# Patient Record
Sex: MALE | Race: WHITE | Employment: FULL TIME | URBAN - METROPOLITAN AREA
[De-identification: names, ages, dates, MRNs, and addresses within clinical notes are randomized per-mention and may not be internally consistent; named-entity substitution may affect disease eponyms.]

---

## 2018-03-01 ENCOUNTER — OFFICE VISIT (OUTPATIENT)
Dept: SURGERY | Age: 48
End: 2018-03-01

## 2018-03-01 VITALS
BODY MASS INDEX: 35.5 KG/M2 | HEIGHT: 70 IN | SYSTOLIC BLOOD PRESSURE: 122 MMHG | WEIGHT: 248 LBS | DIASTOLIC BLOOD PRESSURE: 60 MMHG

## 2018-03-01 DIAGNOSIS — D17.1 LIPOMA OF TORSO: Primary | ICD-10-CM

## 2018-03-01 PROCEDURE — G8427 DOCREV CUR MEDS BY ELIG CLIN: HCPCS | Performed by: SURGERY

## 2018-03-01 PROCEDURE — G8417 CALC BMI ABV UP PARAM F/U: HCPCS | Performed by: SURGERY

## 2018-03-01 PROCEDURE — G8484 FLU IMMUNIZE NO ADMIN: HCPCS | Performed by: SURGERY

## 2018-03-01 PROCEDURE — 99241 PR OFFICE CONSULTATION NEW/ESTAB PATIENT 15 MIN: CPT | Performed by: SURGERY

## 2018-03-01 ASSESSMENT — ENCOUNTER SYMPTOMS
ABDOMINAL PAIN: 1
ALLERGIC/IMMUNOLOGIC NEGATIVE: 1
RESPIRATORY NEGATIVE: 1
EYES NEGATIVE: 1

## 2018-03-01 NOTE — PROGRESS NOTES
Subjective:      Billie Pierre is a 56 y.o. male     CC: Subcutaneous mass of the abdominal wall    HPI: 79-year-old male who presents for evaluation of a subcutaneous mass of the abdominal wall which has been present for about 2 weeks. The mass is very tender and causes shooting pains down his abdomen. The patient reports the mass increases and decreases in size. Family History   Problem Relation Age of Onset   Gemini Gr Breast Cancer Mother     Stroke Father     Atrial Fibrillation Father        Past Medical History:   Diagnosis Date    Hyperlipidemia     Prostatitis        Past Surgical History:   Procedure Laterality Date    HERNIA REPAIR      BABY BILAT ING HERNIA           Prior to Visit Medications    Medication Sig Taking? Authorizing Provider   metFORMIN (GLUCOPHAGE) 500 MG tablet TAKE 1 TABLET BY MOUTH TWICE A DAY Yes Historical Provider, MD   sertraline (ZOLOFT) 50 MG tablet Take 50 mg by mouth daily Yes Historical Provider, MD   Finasteride (PROPECIA PO) Take by mouth Yes Historical Provider, MD       Social History     Social History    Marital status:      Spouse name: N/A    Number of children: N/A    Years of education: N/A     Occupational History    Not on file. Social History Main Topics    Smoking status: Former Smoker     Packs/day: 0.50    Smokeless tobacco: Never Used    Alcohol use No    Drug use: No    Sexual activity: Not on file     Other Topics Concern    Not on file     Social History Narrative    No narrative on file       Review of Systems   Constitutional: Negative. HENT: Negative. Eyes: Negative. Respiratory: Negative. Cardiovascular: Negative. Gastrointestinal: Positive for abdominal pain. Endocrine: Negative. Genitourinary: Negative. Musculoskeletal: Negative. Skin: Negative. Allergic/Immunologic: Negative. Neurological: Negative. Hematological: Negative. Psychiatric/Behavioral: Negative.         Objective: Physical Exam   Constitutional: He is oriented to person, place, and time. He appears well-developed and well-nourished. HENT:   Head: Normocephalic and atraumatic. Right Ear: External ear normal.   Left Ear: External ear normal.   Eyes: Conjunctivae and EOM are normal.   Neck: Normal range of motion. Neck supple. Cardiovascular: Normal rate and regular rhythm. Pulmonary/Chest: Effort normal and breath sounds normal.   Musculoskeletal: Normal range of motion. He exhibits no edema. Neurological: He is alert and oriented to person, place, and time. Skin: Skin is warm and dry. Psychiatric: He has a normal mood and affect. His behavior is normal.    no evidence of a ventral hernia  The patient has a 3 cm subcu mass of the right upper quadrant of the abdominal wall. The mass is tender to palpation    Assessment:      56year old male who presents for evaluation of a tender subcutaneous mass of the right upper quadrant of the abdomen. On physical examination, the patient has no evidence of a ventral hernia. He does have a 3 cm subcutaneous mass which is clinically consistent with a lipoma. Plan: Will plan for excision of lipoma of the abdominal wall to earliest availability.

## 2018-03-08 ENCOUNTER — HOSPITAL ENCOUNTER (OUTPATIENT)
Dept: SURGERY | Age: 48
Discharge: HOME | End: 2018-03-08
Attending: SURGERY | Admitting: SURGERY

## 2018-03-08 VITALS
OXYGEN SATURATION: 99 % | DIASTOLIC BLOOD PRESSURE: 86 MMHG | TEMPERATURE: 97 F | RESPIRATION RATE: 16 BRPM | HEART RATE: 78 BPM | SYSTOLIC BLOOD PRESSURE: 145 MMHG

## 2018-03-08 DIAGNOSIS — D17.1 LIPOMA OF TORSO: Primary | ICD-10-CM

## 2018-03-08 LAB
A/G RATIO: 1.9 (ref 1.1–2.2)
ALBUMIN SERPL-MCNC: 4.2 G/DL (ref 3.4–5)
ALP BLD-CCNC: 77 U/L (ref 40–129)
ALT SERPL-CCNC: 38 U/L (ref 10–40)
ANION GAP SERPL CALCULATED.3IONS-SCNC: 12 MMOL/L (ref 3–16)
AST SERPL-CCNC: 29 U/L (ref 15–37)
BILIRUB SERPL-MCNC: 0.4 MG/DL (ref 0–1)
BUN BLDV-MCNC: 16 MG/DL (ref 7–20)
CALCIUM SERPL-MCNC: 9.3 MG/DL (ref 8.3–10.6)
CHLORIDE BLD-SCNC: 105 MMOL/L (ref 99–110)
CO2: 25 MMOL/L (ref 21–32)
CREAT SERPL-MCNC: 1.1 MG/DL (ref 0.9–1.3)
GFR AFRICAN AMERICAN: >60
GFR NON-AFRICAN AMERICAN: >60
GLOBULIN: 2.2 G/DL
GLUCOSE BLD-MCNC: 105 MG/DL (ref 70–99)
HCT VFR BLD CALC: 42.6 % (ref 40.5–52.5)
HEMOGLOBIN: 14.9 G/DL (ref 13.5–17.5)
MCH RBC QN AUTO: 31.2 PG (ref 26–34)
MCHC RBC AUTO-ENTMCNC: 35 G/DL (ref 31–36)
MCV RBC AUTO: 89.1 FL (ref 80–100)
PDW BLD-RTO: 13.2 % (ref 12.4–15.4)
PLATELET # BLD: 207 K/UL (ref 135–450)
PMV BLD AUTO: 6.9 FL (ref 5–10.5)
POTASSIUM SERPL-SCNC: 4.3 MMOL/L (ref 3.5–5.1)
RBC # BLD: 4.78 M/UL (ref 4.2–5.9)
SODIUM BLD-SCNC: 142 MMOL/L (ref 136–145)
TOTAL PROTEIN: 6.4 G/DL (ref 6.4–8.2)
WBC # BLD: 7.5 K/UL (ref 4–11)

## 2018-03-08 PROCEDURE — 22903 EXC ABD LES SC 3 CM/>: CPT | Performed by: SURGERY

## 2018-03-08 RX ORDER — FENTANYL CITRATE 50 UG/ML
25 INJECTION, SOLUTION INTRAMUSCULAR; INTRAVENOUS EVERY 5 MIN PRN
Status: DISCONTINUED | OUTPATIENT
Start: 2018-03-08 | End: 2018-03-09 | Stop reason: HOSPADM

## 2018-03-08 RX ORDER — FENTANYL CITRATE 50 UG/ML
50 INJECTION, SOLUTION INTRAMUSCULAR; INTRAVENOUS EVERY 5 MIN PRN
Status: DISCONTINUED | OUTPATIENT
Start: 2018-03-08 | End: 2018-03-09 | Stop reason: HOSPADM

## 2018-03-08 RX ORDER — OXYCODONE HYDROCHLORIDE AND ACETAMINOPHEN 5; 325 MG/1; MG/1
1 TABLET ORAL PRN
Status: ACTIVE | OUTPATIENT
Start: 2018-03-08 | End: 2018-03-08

## 2018-03-08 RX ORDER — HYDROCODONE BITARTRATE AND ACETAMINOPHEN 5; 325 MG/1; MG/1
1-2 TABLET ORAL EVERY 4 HOURS PRN
Qty: 15 TABLET | Refills: 0 | Status: SHIPPED | OUTPATIENT
Start: 2018-03-08 | End: 2018-03-13

## 2018-03-08 RX ORDER — LABETALOL HYDROCHLORIDE 5 MG/ML
5 INJECTION, SOLUTION INTRAVENOUS EVERY 10 MIN PRN
Status: DISCONTINUED | OUTPATIENT
Start: 2018-03-08 | End: 2018-03-09 | Stop reason: HOSPADM

## 2018-03-08 RX ORDER — DIPHENHYDRAMINE HYDROCHLORIDE 50 MG/ML
12.5 INJECTION INTRAMUSCULAR; INTRAVENOUS
Status: ACTIVE | OUTPATIENT
Start: 2018-03-08 | End: 2018-03-08

## 2018-03-08 RX ORDER — OXYCODONE HYDROCHLORIDE AND ACETAMINOPHEN 5; 325 MG/1; MG/1
2 TABLET ORAL PRN
Status: ACTIVE | OUTPATIENT
Start: 2018-03-08 | End: 2018-03-08

## 2018-03-08 RX ORDER — MEPERIDINE HYDROCHLORIDE 25 MG/ML
12.5 INJECTION INTRAMUSCULAR; INTRAVENOUS; SUBCUTANEOUS EVERY 5 MIN PRN
Status: DISCONTINUED | OUTPATIENT
Start: 2018-03-08 | End: 2018-03-09 | Stop reason: HOSPADM

## 2018-03-08 RX ORDER — ONDANSETRON 2 MG/ML
4 INJECTION INTRAMUSCULAR; INTRAVENOUS
Status: ACTIVE | OUTPATIENT
Start: 2018-03-08 | End: 2018-03-08

## 2018-03-08 RX ORDER — SODIUM CHLORIDE 9 MG/ML
INJECTION, SOLUTION INTRAVENOUS CONTINUOUS
Status: DISCONTINUED | OUTPATIENT
Start: 2018-03-08 | End: 2018-03-09 | Stop reason: HOSPADM

## 2018-03-08 RX ORDER — HYDROMORPHONE HCL 110MG/55ML
0.5 PATIENT CONTROLLED ANALGESIA SYRINGE INTRAVENOUS EVERY 5 MIN PRN
Status: DISCONTINUED | OUTPATIENT
Start: 2018-03-08 | End: 2018-03-09 | Stop reason: HOSPADM

## 2018-03-08 RX ADMIN — SODIUM CHLORIDE: 9 INJECTION, SOLUTION INTRAVENOUS at 07:25

## 2018-03-08 ASSESSMENT — PAIN DESCRIPTION - FREQUENCY: FREQUENCY: CONTINUOUS

## 2018-03-08 ASSESSMENT — PAIN DESCRIPTION - DESCRIPTORS
DESCRIPTORS: ACHING
DESCRIPTORS: ACHING;CONSTANT

## 2018-03-08 ASSESSMENT — PAIN SCALES - GENERAL
PAINLEVEL_OUTOF10: 3
PAINLEVEL_OUTOF10: 4

## 2018-03-08 ASSESSMENT — PAIN DESCRIPTION - ORIENTATION: ORIENTATION: RIGHT

## 2018-03-08 ASSESSMENT — PAIN DESCRIPTION - LOCATION: LOCATION: ABDOMEN

## 2018-03-08 NOTE — H&P
[facility]      I have reviewed the history and physical and examined the patient and find no relevant changes. I have reviewed with the patient and/or family the risks, benefits, and alternatives to the procedure.     Meredith Love MD  3/8/2018

## 2018-03-08 NOTE — ANESTHESIA PRE-OP
BP Readings from Last 3 Encounters:   03/08/18 115/69   03/01/18 122/60   02/14/12 142/83       NPO Status: Time of last liquid consumption: 1800                        Time of last solid consumption: 1800                        Date of last liquid consumption: 03/07/18                        Date of last solid food consumption: 03/07/18    BMI:   Wt Readings from Last 3 Encounters:   03/05/18 249 lb (112.9 kg)   03/01/18 248 lb (112.5 kg)   02/14/12 220 lb (99.8 kg)     There is no height or weight on file to calculate BMI. Anesthesia Evaluation  Patient summary reviewed and Nursing notes reviewed  Airway: Mallampati: II  TM distance: >3 FB   Neck ROM: full  Mouth opening: > = 3 FB Dental: normal exam         Pulmonary:Negative Pulmonary ROS                              Cardiovascular:  Exercise tolerance: good (>4 METS),                     Neuro/Psych:   Negative Neuro/Psych ROS              GI/Hepatic/Renal: Neg GI/Hepatic/Renal ROS            Endo/Other:                      ROS comment: Glucose intolerance Abdominal:           Vascular:                                        Anesthesia Plan      MAC     ASA 2       Induction: intravenous. Anesthetic plan and risks discussed with patient. Plan discussed with CRNA.                   Michelle Lawrence MD   3/8/2018

## 2018-03-08 NOTE — BRIEF OP NOTE
Brief Postoperative Note    Jillian Dale  YOB: 1970  [id number]    Pre-operative Diagnosis: lipoma of the abdominal wall    Post-operative Diagnosis: Same    Procedure: excision of lipoma of the abdomen    Anesthesia: MAC and Local    Surgeons/Assistants: Bridget Esparza    Estimated Blood Loss: less than 50     Complications: None    Specimens: Was Obtained: lipoma    Findings: 3.5 cm lipoma    Electronically signed by Tonie Lee MD on 3/8/2018 at 8:45 AM

## 2018-03-08 NOTE — ANESTHESIA POST-OP
Anesthesia Post-op Note    Patient: Jose Sterling  MRN: [id number]  YOB: 1970  Date of evaluation: 3/8/2018  Time:  1:21 PM     Procedure(s) Performed:     Last Vitals: BP (!) 145/86   Pulse 78   Temp 97 °F (36.1 °C) (Temporal)   Resp 16   SpO2 99%     Aditya Phase I: Aditya Score: 8    Aditya Phase II: Aditya Score: 10    Anesthesia Post Evaluation    Final anesthesia type: general  Patient location during evaluation: PACU  Patient participation: complete - patient participated  Level of consciousness: awake and alert  Airway patency: patent  Nausea & Vomiting: no nausea and no vomiting  Complications: no  Cardiovascular status: hemodynamically stable  Respiratory status: acceptable  Hydration status: stable        Joselin Holguin MD  1:21 PM

## 2022-06-07 NOTE — PROGRESS NOTES
[facility]   Cardiac Consultation    Referring Provider:  Damien Fiore MD     Chief Complaint   Patient presents with    Chest Pain     right arm stiffness and pain     New Patient        History of Present Illness:  Reece Escamilla is a 49 y.o.male patient with h/o hyperlipidemia, prediabetic and prostatitis and carpal tunnel. He smoked 20 years ago, he does not drink. Today, Reece Escamilla is here as a new patient for chest pain. 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 He lost 35 lbs over last 4 mos. He explains this CP happened about 2 weeks ago - he had mid chest pain when working on a ladder/putting trim on door way, it radiated to his back and arm. He denies chest pain when exerting self on golf course. Denies any symptoms at this time. Past Medical History:   has a past medical history of Hyperlipidemia, Inflammatory disorder of cartilage, Prediabetes, and Prostatitis. Surgical History:   has a past surgical history that includes hernia repair and other surgical history (Right, 03/08/2018). Social History:   reports that he quit smoking about 15 years ago. His smoking use included cigarettes. He has a 6.00 pack-year smoking history. He has never used smokeless tobacco. He reports current alcohol use of about 3.0 standard drinks of alcohol per week. He reports that he does not use drugs. Family History:  family history includes Atrial Fibrillation in his father; Breast Cancer in his mother; Stroke in his father. Home Medications:  Prior to Admission medications    Medication Sig Start Date End Date Taking?  Authorizing Provider   amphetamine-dextroamphetamine (ADDERALL) 10 MG tablet TAKE 1 TABLET BY MOUTH EVERY DAY AFTERNOON 6/4/22  Yes Historical Provider, MD   rosuvastatin (CRESTOR) 20 MG tablet  6/7/22  Yes Historical Provider, MD   traZODone (DESYREL) 50 MG tablet TAKE 1 TO 2 TABLETS BY MOUTH AT BEDTIME 5/18/22  Yes Historical Provider, MD   metFORMIN (GLUCOPHAGE) 500 MG tablet TAKE 1 TABLET BY MOUTH TWICE A DAY  Patient not taking: Reported on 6/8/2022 2/9/18   Historical Provider, MD   sertraline (ZOLOFT) 50 MG tablet Take 50 mg by mouth daily  Patient not taking: Reported on 6/8/2022    Historical Provider, MD   Finasteride (PROPECIA PO) Take by mouth  Patient not taking: Reported on 6/8/2022    Historical Provider, MD        Allergies:  Food     Review of Systems:   · Constitutional: there has been no unanticipated weight loss. There's been no change in energy level, sleep pattern, or activity level. · Eyes: No visual changes or diplopia. No scleral icterus. · ENT: No Headaches, hearing loss or vertigo. No mouth sores or sore throat. · Cardiovascular: Reviewed in HPI  · Respiratory: No cough or wheezing, no sputum production. No hematemesis. · Gastrointestinal: No abdominal pain, appetite loss, blood in stools. No change in bowel or bladder habits. · Genitourinary: No dysuria, trouble voiding, or hematuria. · Musculoskeletal:  No gait disturbance, weakness or joint complaints. · Integumentary: No rash or pruritis. · Neurological: No headache, diplopia, change in muscle strength, numbness or tingling. No change in gait, balance, coordination, mood, affect, memory, mentation, behavior. · Psychiatric: No anxiety, no depression. · Endocrine: No malaise, fatigue or temperature intolerance. No excessive thirst, fluid intake, or urination. No tremor. · Hematologic/Lymphatic: No abnormal bruising or bleeding, blood clots or swollen lymph nodes. · Allergic/Immunologic: No nasal congestion or hives.     Physical Examination:    Vitals:    06/08/22 1119   BP: 120/86   Pulse: 87   SpO2: 97%        Wt Readings from Last 1 Encounters:   06/08/22 226 lb 14.4 oz (102.9 kg)        Constitutional and General Appearance: NAD   Skin:good turgor,intact without lesions  HEENT: EOMI ,normal  Neck:no JVD     Respiratory:  · Normal excursion and expansion without use of accessory muscles  · Resp Auscultation: Normal breath sounds without dullness  Cardiovascular:  · The apical impulses not displaced  · Heart tones are crisp and normal  · Cervical veins are not engorged  · The carotid upstroke is normal in amplitude and contour without delay or bruit  · Peripheral pulses are symmetrical and full  · There is no clubbing, cyanosis of the extremities. · No edema  · Femoral Arteries: 2+ and equal  · Pedal Pulses: 2+ and equal   Abdomen:  · No masses or tenderness  · Liver/Spleen: No Abnormalities Noted  Neurological/Psychiatric:  · Alert and oriented in all spheres  · Moves all extremities well  · Exhibits normal gait balance and coordination  · No abnormalities of mood, affect, memory, mentation, or behavior are noted      Assessment:    Chest pain    Likely muscular pain  Stress echo at [facility]    Hyperlipidemia  Continue on crestor      Family history of heart disease  Dad with h/o stroke and long QT and s/p watchman. His mom  of CHF      Plan:  Cardiac test and lab results personally reviewed by me during this office visit and discussed. Stress echo at [facility] when I am at [facility]  Call if CP episodes reoccur   Call if any new or worsening symptoms present. Follow up in based on testing      I appreciate the opportunity of cooperating in the care of this individual.    Blossom Castro. Norris Mcconnell M.D., [facility]    Patient's problem list, medications, allergies, past medical, surgical, social and family histories were reviewed and updated as appropriate. Scribe's attestation: This note was scribed in the presence of Dr Norris Mcconnell by Vickey Coronado RN. The scribe's documentation has been prepared under my direction and personally reviewed by me in its entirety. I confirm that the note above accurately reflects all work, treatment, procedures, and medical decision making performed by me.

## 2022-06-08 ENCOUNTER — OFFICE VISIT (OUTPATIENT)
Dept: CARDIOLOGY CLINIC | Age: 52
End: 2022-06-08
Payer: COMMERCIAL

## 2022-06-08 VITALS
BODY MASS INDEX: 32.48 KG/M2 | HEART RATE: 87 BPM | OXYGEN SATURATION: 97 % | DIASTOLIC BLOOD PRESSURE: 86 MMHG | SYSTOLIC BLOOD PRESSURE: 120 MMHG | WEIGHT: 226.9 LBS | HEIGHT: 70 IN

## 2022-06-08 DIAGNOSIS — R07.9 CHEST PAIN, UNSPECIFIED TYPE: Primary | ICD-10-CM

## 2022-06-08 DIAGNOSIS — E78.5 HYPERLIPIDEMIA, UNSPECIFIED HYPERLIPIDEMIA TYPE: ICD-10-CM

## 2022-06-08 DIAGNOSIS — Z82.49 FAMILY HISTORY OF HEART DISEASE: ICD-10-CM

## 2022-06-08 PROCEDURE — 93000 ELECTROCARDIOGRAM COMPLETE: CPT | Performed by: INTERNAL MEDICINE

## 2022-06-08 PROCEDURE — 99214 OFFICE O/P EST MOD 30 MIN: CPT | Performed by: INTERNAL MEDICINE

## 2022-06-08 PROCEDURE — 3017F COLORECTAL CA SCREEN DOC REV: CPT | Performed by: INTERNAL MEDICINE

## 2022-06-08 PROCEDURE — G8427 DOCREV CUR MEDS BY ELIG CLIN: HCPCS | Performed by: INTERNAL MEDICINE

## 2022-06-08 PROCEDURE — G8417 CALC BMI ABV UP PARAM F/U: HCPCS | Performed by: INTERNAL MEDICINE

## 2022-06-08 PROCEDURE — 1036F TOBACCO NON-USER: CPT | Performed by: INTERNAL MEDICINE

## 2022-06-08 RX ORDER — TRAZODONE HYDROCHLORIDE 50 MG/1
TABLET ORAL
COMMUNITY
Start: 2022-05-18

## 2022-06-08 RX ORDER — ROSUVASTATIN CALCIUM 20 MG/1
TABLET, COATED ORAL
COMMUNITY
Start: 2022-06-07

## 2022-06-08 RX ORDER — DEXTROAMPHETAMINE SACCHARATE, AMPHETAMINE ASPARTATE, DEXTROAMPHETAMINE SULFATE AND AMPHETAMINE SULFATE 2.5; 2.5; 2.5; 2.5 MG/1; MG/1; MG/1; MG/1
TABLET ORAL
COMMUNITY
Start: 2022-06-04

## 2022-06-08 NOTE — PATIENT INSTRUCTIONS
Stress echo at [address] [place] [address] when Dr Leobardo King is at [facility], [facility] station  Call if Chest pain episodes reoccur   Call if any new or worsening symptoms present.   Follow up based on testing

## 2022-07-12 DIAGNOSIS — R91.1 LUNG NODULE: Primary | ICD-10-CM

## 2022-07-21 ENCOUNTER — HOSPITAL ENCOUNTER (OUTPATIENT)
Dept: CT IMAGING | Age: 52
Discharge: HOME | End: 2022-07-21
Payer: COMMERCIAL

## 2022-07-21 DIAGNOSIS — R91.1 LUNG NODULE: ICD-10-CM

## 2022-07-21 PROCEDURE — 71250 CT THORAX DX C-: CPT

## 2022-07-27 ENCOUNTER — PROCEDURE VISIT (OUTPATIENT)
Dept: CARDIOLOGY CLINIC | Age: 52
End: 2022-07-27
Payer: COMMERCIAL

## 2022-07-27 DIAGNOSIS — R07.9 CHEST PAIN, UNSPECIFIED TYPE: ICD-10-CM

## 2022-07-27 DIAGNOSIS — E78.5 HYPERLIPIDEMIA, UNSPECIFIED HYPERLIPIDEMIA TYPE: ICD-10-CM

## 2022-07-27 DIAGNOSIS — Z82.49 FAMILY HISTORY OF HEART DISEASE: ICD-10-CM

## 2022-07-27 LAB
LV EF: 50 %
LVEF MODALITY: NORMAL

## 2022-07-27 PROCEDURE — 93320 DOPPLER ECHO COMPLETE: CPT | Performed by: INTERNAL MEDICINE

## 2022-07-27 PROCEDURE — 93351 STRESS TTE COMPLETE: CPT | Performed by: INTERNAL MEDICINE

## 2022-07-27 PROCEDURE — 93325 DOPPLER ECHO COLOR FLOW MAPG: CPT | Performed by: INTERNAL MEDICINE

## 2022-12-08 ENCOUNTER — TELEPHONE (OUTPATIENT)
Dept: CARDIOLOGY CLINIC | Age: 52
End: 2022-12-08

## 2022-12-08 NOTE — TELEPHONE ENCOUNTER
[facility] calling to get an EKG tracing for surgical clearance. They have already received a clearance letter and a copy of the echo, just need the EKG tracing. They said the EKG tracing from the stress test would be fine if we don't have one from the office. Please fax to [phone]. Patient's surgery is tomorrow.

## 2024-05-31 ENCOUNTER — OFFICE VISIT (OUTPATIENT)
Dept: SURGERY | Age: 54
End: 2024-05-31
Payer: COMMERCIAL

## 2024-05-31 VITALS
HEIGHT: 70 IN | WEIGHT: 239.2 LBS | BODY MASS INDEX: 34.24 KG/M2 | SYSTOLIC BLOOD PRESSURE: 136 MMHG | DIASTOLIC BLOOD PRESSURE: 84 MMHG

## 2024-05-31 DIAGNOSIS — D17.23 LIPOMA OF RIGHT LOWER EXTREMITY: Primary | ICD-10-CM

## 2024-05-31 PROCEDURE — G8427 DOCREV CUR MEDS BY ELIG CLIN: HCPCS | Performed by: SURGERY

## 2024-05-31 PROCEDURE — 1036F TOBACCO NON-USER: CPT | Performed by: SURGERY

## 2024-05-31 PROCEDURE — 3017F COLORECTAL CA SCREEN DOC REV: CPT | Performed by: SURGERY

## 2024-05-31 PROCEDURE — 99202 OFFICE O/P NEW SF 15 MIN: CPT | Performed by: SURGERY

## 2024-05-31 PROCEDURE — G8417 CALC BMI ABV UP PARAM F/U: HCPCS | Performed by: SURGERY

## 2024-05-31 RX ORDER — SODIUM CHLORIDE 0.9 % (FLUSH) 0.9 %
5-40 SYRINGE (ML) INJECTION EVERY 12 HOURS SCHEDULED
OUTPATIENT
Start: 2024-05-31

## 2024-05-31 RX ORDER — SODIUM CHLORIDE 0.9 % (FLUSH) 0.9 %
5-40 SYRINGE (ML) INJECTION PRN
OUTPATIENT
Start: 2024-05-31

## 2024-05-31 RX ORDER — SODIUM CHLORIDE 9 MG/ML
INJECTION, SOLUTION INTRAVENOUS PRN
OUTPATIENT
Start: 2024-05-31

## 2024-05-31 ASSESSMENT — ENCOUNTER SYMPTOMS
EYE ITCHING: 0
COLOR CHANGE: 0
EYE DISCHARGE: 0
APNEA: 0
ABDOMINAL PAIN: 0
BACK PAIN: 1
CHEST TIGHTNESS: 0
ABDOMINAL DISTENTION: 0

## 2024-05-31 NOTE — PROGRESS NOTES
:     Milad Peters is a 53 y.o. male     CC: Soft tissue mass of the right medial thigh    HPI: 53-year-old male who presents for evaluation of a soft tissue mass of the right medial thigh.  The mass has been present for about 3 years.  He reports that it increases and decreases in size.  It does cause localized symptoms of discomfort.      Past Medical History:   Diagnosis Date    Hyperlipidemia     Inflammatory disorder of cartilage     Prediabetes     Prostatitis        Food     Past Surgical History:   Procedure Laterality Date    HERNIA REPAIR      BABY BILAT ING HERNIA    OTHER SURGICAL HISTORY Right 2018    EXCISION OF LIPOMA RIGHT UPPER ABDOMEN        Prior to Visit Medications    Medication Sig Taking? Authorizing Provider   amphetamine-dextroamphetamine (ADDERALL) 10 MG tablet TAKE 1 TABLET BY MOUTH EVERY DAY AFTERNOON  Vel Tyson MD   rosuvastatin (CRESTOR) 20 MG tablet   Vel Tyson MD   traZODone (DESYREL) 50 MG tablet TAKE 1 TO 2 TABLETS BY MOUTH AT BEDTIME  Vel Tyson MD   metFORMIN (GLUCOPHAGE) 500 MG tablet TAKE 1 TABLET BY MOUTH TWICE A DAY  Patient not taking: Reported on 2022  Vel Tyson MD   sertraline (ZOLOFT) 50 MG tablet Take 50 mg by mouth daily  Patient not taking: Reported on 2022  Vel Tyson MD   Finasteride (PROPECIA PO) Take by mouth  Patient not taking: Reported on 2022  Vel Tyson MD       Social History     Socioeconomic History    Marital status:      Spouse name: Not on file    Number of children: Not on file    Years of education: Not on file    Highest education level: Not on file   Occupational History    Not on file   Tobacco Use    Smoking status: Former     Current packs/day: 0.00     Average packs/day: 0.5 packs/day for 12.0 years (6.0 ttl pk-yrs)     Types: Cigarettes     Start date:      Quit date: 2007     Years since quittin.4    Smokeless tobacco: Never   Vaping Use

## 2024-09-10 ENCOUNTER — TRANSCRIBE ORDERS (OUTPATIENT)
Dept: ADMINISTRATIVE | Age: 54
End: 2024-09-10

## 2024-09-10 DIAGNOSIS — M47.812 CERVICAL SPONDYLOSIS: ICD-10-CM

## 2024-09-10 DIAGNOSIS — M54.12 CERVICAL RADICULOPATHY: Primary | ICD-10-CM

## 2024-09-17 ENCOUNTER — HOSPITAL ENCOUNTER (OUTPATIENT)
Dept: NUCLEAR MEDICINE | Age: 54
Discharge: HOME | End: 2024-09-17
Attending: STUDENT IN AN ORGANIZED HEALTH CARE EDUCATION/TRAINING PROGRAM
Payer: COMMERCIAL

## 2024-09-17 DIAGNOSIS — M54.12 CERVICAL RADICULOPATHY: ICD-10-CM

## 2024-09-17 DIAGNOSIS — M47.812 CERVICAL SPONDYLOSIS: ICD-10-CM

## 2024-09-17 PROCEDURE — 78306 BONE IMAGING WHOLE BODY: CPT | Performed by: STUDENT IN AN ORGANIZED HEALTH CARE EDUCATION/TRAINING PROGRAM

## 2024-09-17 PROCEDURE — 78803 RP LOCLZJ TUM SPECT 1 AREA: CPT

## 2024-09-17 PROCEDURE — A9503 TC99M MEDRONATE: HCPCS | Performed by: STUDENT IN AN ORGANIZED HEALTH CARE EDUCATION/TRAINING PROGRAM

## 2024-09-17 PROCEDURE — 3430000000 HC RX DIAGNOSTIC RADIOPHARMACEUTICAL: Performed by: STUDENT IN AN ORGANIZED HEALTH CARE EDUCATION/TRAINING PROGRAM

## 2024-09-17 RX ORDER — TC 99M MEDRONATE 20 MG/10ML
26.5 INJECTION, POWDER, LYOPHILIZED, FOR SOLUTION INTRAVENOUS
Status: COMPLETED | OUTPATIENT
Start: 2024-09-17 | End: 2024-09-17

## 2024-09-17 RX ADMIN — TC 99M MEDRONATE 26.5 MILLICURIE: 20 INJECTION, POWDER, LYOPHILIZED, FOR SOLUTION INTRAVENOUS at 08:12

## 2024-09-17 NOTE — PROGRESS NOTES
[facility]   Cardiac follow up    Referring Provider:  Florencio Hill DO     Chief Complaint   Patient presents with    Hypertension        History of Present Illness:  Milad is a 54 y.o.male patient with h/o hyperlipidemia, prediabetic and prostatitis and carpal tunnel. He smoked 20 years ago, he does not drink.  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     Today, Milad is here for follow up due to blood pressure. In May, he had lumbar surgery and since then he has had no energy.  Just walking from his car to this visit makes him tired. Since July he has gone to PT 3 times per week. He's on medical leave from work. He last played golf in March. He walks at [employer] almost every day, but does not play golf.  It is very depressing to him that he has not played golf. He no longer sees Dr Domínguez, now he sees Dr Hill. Per pt report, he had MRI that showed \"cervical artery blockage,\" so he takes Aspirin every day. His father had CABG & stroke and he was a smoker.    Past Medical History:   has a past medical history of Hyperlipidemia, Inflammatory disorder of cartilage, Prediabetes, and Prostatitis.    Surgical History:   has a past surgical history that includes hernia repair; other surgical history (Right, 03/08/2018); and back surgery.     Social History:   reports that he quit smoking about 17 years ago. His smoking use included cigarettes. He started smoking about 29 years ago. He has a 6 pack-year smoking history. He has never used smokeless tobacco. He reports current alcohol use of about 3.0 standard drinks of alcohol per week. He reports that he does not use drugs.     Family History:  family history includes Atrial Fibrillation in his father; Breast Cancer in his mother; Stroke in his father.     Home Medications:  Prior to Admission medications    Medication Sig Start Date End Date Taking? Authorizing Provider   aspirin 325 MG tablet Take 1 tablet by mouth 2 times daily

## 2024-10-08 ENCOUNTER — OFFICE VISIT (OUTPATIENT)
Dept: CARDIOLOGY CLINIC | Age: 54
End: 2024-10-08
Payer: COMMERCIAL

## 2024-10-08 ENCOUNTER — TELEPHONE (OUTPATIENT)
Dept: CARDIOLOGY CLINIC | Age: 54
End: 2024-10-08

## 2024-10-08 VITALS
WEIGHT: 235 LBS | HEART RATE: 71 BPM | HEIGHT: 70 IN | DIASTOLIC BLOOD PRESSURE: 80 MMHG | SYSTOLIC BLOOD PRESSURE: 138 MMHG | BODY MASS INDEX: 33.64 KG/M2 | OXYGEN SATURATION: 98 %

## 2024-10-08 DIAGNOSIS — E78.5 HYPERLIPIDEMIA, UNSPECIFIED HYPERLIPIDEMIA TYPE: ICD-10-CM

## 2024-10-08 DIAGNOSIS — Z82.49 FAMILY HISTORY OF HEART DISEASE: ICD-10-CM

## 2024-10-08 DIAGNOSIS — R06.02 SOB (SHORTNESS OF BREATH): Primary | ICD-10-CM

## 2024-10-08 DIAGNOSIS — R53.83 OTHER FATIGUE: ICD-10-CM

## 2024-10-08 PROCEDURE — G8484 FLU IMMUNIZE NO ADMIN: HCPCS | Performed by: INTERNAL MEDICINE

## 2024-10-08 PROCEDURE — 3017F COLORECTAL CA SCREEN DOC REV: CPT | Performed by: INTERNAL MEDICINE

## 2024-10-08 PROCEDURE — 1036F TOBACCO NON-USER: CPT | Performed by: INTERNAL MEDICINE

## 2024-10-08 PROCEDURE — G8417 CALC BMI ABV UP PARAM F/U: HCPCS | Performed by: INTERNAL MEDICINE

## 2024-10-08 PROCEDURE — 99214 OFFICE O/P EST MOD 30 MIN: CPT | Performed by: INTERNAL MEDICINE

## 2024-10-08 PROCEDURE — G8427 DOCREV CUR MEDS BY ELIG CLIN: HCPCS | Performed by: INTERNAL MEDICINE

## 2024-10-08 PROCEDURE — 93000 ELECTROCARDIOGRAM COMPLETE: CPT | Performed by: INTERNAL MEDICINE

## 2024-10-08 RX ORDER — SODIUM CHLORIDE 9 MG/ML
INJECTION, SOLUTION INTRAVENOUS PRN
OUTPATIENT
Start: 2024-10-08

## 2024-10-08 RX ORDER — SODIUM CHLORIDE 0.9 % (FLUSH) 0.9 %
5-40 SYRINGE (ML) INJECTION PRN
OUTPATIENT
Start: 2024-10-08

## 2024-10-08 RX ORDER — NITROGLYCERIN 0.4 MG/1
0.4 TABLET SUBLINGUAL
OUTPATIENT
Start: 2024-10-08 | End: 2024-10-09

## 2024-10-08 RX ORDER — SODIUM CHLORIDE 0.9 % (FLUSH) 0.9 %
5-40 SYRINGE (ML) INJECTION EVERY 12 HOURS SCHEDULED
OUTPATIENT
Start: 2024-10-08

## 2024-10-08 RX ORDER — ASPIRIN 325 MG
325 TABLET ORAL 2 TIMES DAILY
COMMUNITY

## 2024-10-08 RX ORDER — METOPROLOL TARTRATE 50 MG
TABLET ORAL
Qty: 3 TABLET | Refills: 0 | Status: SHIPPED | OUTPATIENT
Start: 2024-10-08

## 2024-10-08 RX ORDER — METOPROLOL TARTRATE 1 MG/ML
5 INJECTION, SOLUTION INTRAVENOUS EVERY 5 MIN PRN
OUTPATIENT
Start: 2024-10-08

## 2024-10-08 RX ORDER — NITROGLYCERIN 0.4 MG/1
0.8 TABLET SUBLINGUAL
OUTPATIENT
Start: 2024-10-08 | End: 2024-10-09

## 2024-10-08 NOTE — TELEPHONE ENCOUNTER
Scheduling states they need an order placed for the following labs:    BUN and Creatine     Patients test is scheduled for 10/21/24.

## 2024-10-08 NOTE — PATIENT INSTRUCTIONS
Lipids today > consider other medications based on results  Coronary CTA  Continue risk factor modifications.   Call for any change in symptoms, call to report any changes in shortness of breath or development of chest pain with activity.    Follow up in 3 mos

## 2024-10-09 LAB
BUN SERPL-MCNC: 14 MG/DL (ref 7–20)
CHOLEST SERPL-MCNC: 280 MG/DL (ref 0–199)
CREAT SERPL-MCNC: 1 MG/DL (ref 0.9–1.3)
GFR SERPLBLD CREATININE-BSD FMLA CKD-EPI: 90 ML/MIN/{1.73_M2}
HDLC SERPL-MCNC: 45 MG/DL (ref 40–60)
LDL CHOLESTEROL: 193 MG/DL
TRIGL SERPL-MCNC: 210 MG/DL (ref 0–150)
VLDLC SERPL CALC-MCNC: 42 MG/DL

## 2024-10-09 RX ORDER — METOPROLOL TARTRATE 50 MG
TABLET ORAL
Qty: 3 TABLET | Refills: 0 | OUTPATIENT
Start: 2024-10-09

## 2024-10-09 NOTE — TELEPHONE ENCOUNTER
Spoke to Milad regarding starting Repatha.    He is interested in Leqvio because administration frequency is less than Repatha.  He still remains concerned that injectable will cause side effect of back pain but is agreeable to try it. He would like to start as soon as possible.

## 2024-10-09 NOTE — TELEPHONE ENCOUNTER
metoprolol tartrate (LOPRESSOR) 50 MG tablet 3 tablet 0 10/8/2024 --    Sig: Two hours before your CTA test check your heart rate, take by mouth 1 tablet if your heart rate is 55-70, 2 tablets if your heart rate is 71-80, 3 tablets if heart rate greater than 80, and no tablets if heart rate is less than 55.    Sent to pharmacy as: Metoprolol Tartrate 50 MG Oral Tablet (LOPRESSOR)    Cosign for Ordering: Required by William Scruggs MD    E-Prescribing Status: Receipt confirmed by pharmacy (10/8/2024 10:23 AM EDT)

## 2024-10-09 NOTE — TELEPHONE ENCOUNTER
----- Message from Dr. William Scruggs MD sent at 10/9/2024  1:04 PM EDT -----  He is statin intolerant. Please start praluent or repatha

## 2024-10-11 ENCOUNTER — TELEPHONE (OUTPATIENT)
Dept: CARDIOLOGY CLINIC | Age: 54
End: 2024-10-11

## 2024-10-11 NOTE — TELEPHONE ENCOUNTER
Per LES > ok to start Leqvio per pt request.  Called pt to inform him of LES message, however, no answer.  Left VM for Milad to call office back.  After speaking with Milad, will then fax order for Leqvio to infusion center.

## 2024-10-14 ENCOUNTER — TELEPHONE (OUTPATIENT)
Dept: INTERVENTIONAL RADIOLOGY/VASCULAR | Age: 54
End: 2024-10-14

## 2024-10-14 NOTE — TELEPHONE ENCOUNTER
Spoke to Milad > ok cullen Stinson to proceed with Leqvio > order faxed to infusion center > received successful fax confirmation.

## 2024-10-15 ENCOUNTER — TELEPHONE (OUTPATIENT)
Dept: CARDIOLOGY CLINIC | Age: 54
End: 2024-10-15

## 2024-10-15 DIAGNOSIS — E78.5 HYPERLIPIDEMIA, UNSPECIFIED HYPERLIPIDEMIA TYPE: Primary | ICD-10-CM

## 2024-10-15 NOTE — TELEPHONE ENCOUNTER
[facility] states pt's Leqvio has been denied. Pt needs a recent BMP and tried and failed previous medication list. Please  Fax [phone]

## 2024-10-17 ENCOUNTER — INITIAL CONSULT (OUTPATIENT)
Dept: SURGERY | Age: 54
End: 2024-10-17

## 2024-10-17 VITALS
WEIGHT: 235 LBS | HEIGHT: 70 IN | SYSTOLIC BLOOD PRESSURE: 130 MMHG | BODY MASS INDEX: 33.64 KG/M2 | DIASTOLIC BLOOD PRESSURE: 84 MMHG

## 2024-10-17 DIAGNOSIS — K80.10 CCC (CHRONIC CALCULOUS CHOLECYSTITIS): Primary | ICD-10-CM

## 2024-10-17 ASSESSMENT — ENCOUNTER SYMPTOMS
ABDOMINAL PAIN: 1
EYE ITCHING: 0
APNEA: 0
NAUSEA: 1
COLOR CHANGE: 0
EYE DISCHARGE: 0
BACK PAIN: 0
CHEST TIGHTNESS: 0

## 2024-10-17 NOTE — TELEPHONE ENCOUNTER
Spoke to Milad > Informed pt of below information.  He states in the past, he tried Crestor and Lipitor and could not tolerate.  He states he had blood work 9/5/24 with PCP.    Then spoke to [employer] > [employer] will send in information with 9/5/24 results, unsure if they will need them more recent or not.      Faxed information per [employer]'s request.

## 2024-10-17 NOTE — PROGRESS NOTES
and leg swelling.   Gastrointestinal:  Positive for abdominal pain and nausea.   Endocrine: Negative for cold intolerance and heat intolerance.   Genitourinary:  Negative for difficulty urinating and dysuria.   Musculoskeletal:  Negative for arthralgias and back pain.   Skin:  Negative for color change and pallor.   Allergic/Immunologic: Negative for environmental allergies and food allergies.   Neurological:  Negative for dizziness and facial asymmetry.   Hematological:  Negative for adenopathy. Does not bruise/bleed easily.   Psychiatric/Behavioral:  Negative for agitation and behavioral problems.        :   Physical Exam  Constitutional:       Appearance: He is well-developed.   HENT:      Head: Normocephalic and atraumatic.      Right Ear: External ear normal.      Left Ear: External ear normal.   Eyes:      Conjunctiva/sclera: Conjunctivae normal.   Cardiovascular:      Rate and Rhythm: Normal rate and regular rhythm.   Pulmonary:      Effort: Pulmonary effort is normal.      Breath sounds: Normal breath sounds.   Abdominal:      General: There is no distension.      Palpations: Abdomen is soft.      Tenderness: There is no abdominal tenderness.   Musculoskeletal:         General: Normal range of motion.      Cervical back: Normal range of motion and neck supple.   Skin:     General: Skin is warm and dry.   Neurological:      Mental Status: He is alert and oriented to person, place, and time.   Psychiatric:         Behavior: Behavior normal.       /84   Ht 1.778 m (5' 10\")   Wt 106.6 kg (235 lb)   BMI 33.72 kg/m²   3 cm soft tissue mass of the right medial thigh consistent with a lipoma.    :      54-year-old male with a history of a complex spine surgery in May of this year that was surgically approached from both the anterior and posterior aspects.  He has some baseline abdominal pain related to his back surgery but is also having intermittent episodes of epigastric and right upper quadrant abdominal

## 2024-10-17 NOTE — TELEPHONE ENCOUNTER
Spoke to Sun[employer].    Per [employer], Leqvio is not denied at this time.  However, BMP is still needed and [employer] is requesting a list of tried/failed medications that she can send to the insurance company.  This list can be placed on fax cover sheet and faxed to [employer] at [phone].

## 2024-10-18 ENCOUNTER — PREP FOR PROCEDURE (OUTPATIENT)
Dept: SURGERY | Age: 54
End: 2024-10-18

## 2024-10-18 DIAGNOSIS — M79.9 LESION OF SOFT TISSUE: ICD-10-CM

## 2024-10-18 PROBLEM — K42.9 UMBILICAL HERNIA: Status: ACTIVE | Noted: 2024-10-18

## 2024-10-18 RX ORDER — SODIUM CHLORIDE 0.9 % (FLUSH) 0.9 %
5-40 SYRINGE (ML) INJECTION EVERY 12 HOURS SCHEDULED
OUTPATIENT
Start: 2024-10-18

## 2024-10-18 RX ORDER — SODIUM CHLORIDE 9 MG/ML
INJECTION, SOLUTION INTRAVENOUS PRN
OUTPATIENT
Start: 2024-10-18

## 2024-10-18 RX ORDER — SODIUM CHLORIDE 0.9 % (FLUSH) 0.9 %
5-40 SYRINGE (ML) INJECTION PRN
OUTPATIENT
Start: 2024-10-18

## 2024-10-21 ENCOUNTER — HOSPITAL ENCOUNTER (OUTPATIENT)
Dept: CT IMAGING | Age: 54
Discharge: HOME | End: 2024-10-21
Attending: INTERNAL MEDICINE
Payer: COMMERCIAL

## 2024-10-21 VITALS
BODY MASS INDEX: 33.64 KG/M2 | SYSTOLIC BLOOD PRESSURE: 137 MMHG | TEMPERATURE: 98.5 F | HEART RATE: 55 BPM | WEIGHT: 235 LBS | DIASTOLIC BLOOD PRESSURE: 98 MMHG | HEIGHT: 70 IN | RESPIRATION RATE: 16 BRPM | OXYGEN SATURATION: 97 %

## 2024-10-21 DIAGNOSIS — R53.83 OTHER FATIGUE: ICD-10-CM

## 2024-10-21 DIAGNOSIS — Z82.49 FAMILY HISTORY OF HEART DISEASE: ICD-10-CM

## 2024-10-21 DIAGNOSIS — E78.5 HYPERLIPIDEMIA, UNSPECIFIED HYPERLIPIDEMIA TYPE: ICD-10-CM

## 2024-10-21 DIAGNOSIS — R06.02 SOB (SHORTNESS OF BREATH): ICD-10-CM

## 2024-10-21 PROCEDURE — 6360000004 HC RX CONTRAST MEDICATION: Performed by: INTERNAL MEDICINE

## 2024-10-21 PROCEDURE — 75574 CT ANGIO HRT W/3D IMAGE: CPT | Performed by: INTERNAL MEDICINE

## 2024-10-21 PROCEDURE — 6370000000 HC RX 637 (ALT 250 FOR IP): Performed by: INTERNAL MEDICINE

## 2024-10-21 PROCEDURE — 75574 CT ANGIO HRT W/3D IMAGE: CPT

## 2024-10-21 RX ORDER — IOPAMIDOL 755 MG/ML
100 INJECTION, SOLUTION INTRAVASCULAR
Status: COMPLETED | OUTPATIENT
Start: 2024-10-21 | End: 2024-10-21

## 2024-10-21 RX ORDER — NITROGLYCERIN 0.4 MG/1
0.8 TABLET SUBLINGUAL ONCE
Status: COMPLETED | OUTPATIENT
Start: 2024-10-21 | End: 2024-10-21

## 2024-10-21 RX ADMIN — IOPAMIDOL 100 ML: 755 INJECTION, SOLUTION INTRAVENOUS at 08:45

## 2024-10-21 RX ADMIN — NITROGLYCERIN 0.8 MG: 0.4 TABLET, ORALLY DISINTEGRATING SUBLINGUAL at 08:53

## 2024-10-21 ASSESSMENT — PAIN - FUNCTIONAL ASSESSMENT: PAIN_FUNCTIONAL_ASSESSMENT: NONE - DENIES PAIN

## 2024-10-21 NOTE — PROGRESS NOTES
Pt complains of 2/10 chest tightness after the nitroglycerin.  VSS, pt states that this happens sometimes when he lays on his back.  Tightness gets better with a little time. Pt would like to procedure with test.

## 2024-10-21 NOTE — PROGRESS NOTES
Procedure complete, pt tolerated well. PIV removed without complication.  Pt is pain free, no tightness, sitting up comfortably .  Pt given written and verbal discharge instructions.  Pt verbalizes understanding

## 2024-10-28 ENCOUNTER — TELEPHONE (OUTPATIENT)
Dept: SURGERY | Age: 54
End: 2024-10-28

## 2024-10-28 NOTE — TELEPHONE ENCOUNTER
Spoke with patient. No fever or coughing, just sinus drainage.Took Covid test which was negative. States he feels 100 % better today.

## 2024-10-28 NOTE — TELEPHONE ENCOUNTER
Pt is scheduled 10/30/24 for surgery. He states this weekend he began getting congested. He thinks it is a sinus infection. Pt would like to know if he can still have surgery. Please call [phone]

## 2024-10-29 ENCOUNTER — TELEPHONE (OUTPATIENT)
Dept: CARDIOLOGY CLINIC | Age: 54
End: 2024-10-29

## 2024-10-29 NOTE — TELEPHONE ENCOUNTER
The patient phoned stating that his PCP is concerned with the results from his recent CTA cardiac on 10/21/24.  The patient is asking to speak with LES or a NP about the results, he states that he is very concerned about what was on the bottom of the report under non cardiac findings, Pulmonary Hypertension, and lymph nodes.  Please advise.  Thank you

## 2024-10-29 NOTE — TELEPHONE ENCOUNTER
RAKAN spoke to patient as well as Dr Hazel > patient aware that providers spoke, pt very appreciative of the call.

## 2024-11-04 ENCOUNTER — CLINICAL DOCUMENTATION (OUTPATIENT)
Facility: HOSPITAL | Age: 54
End: 2024-11-04

## 2024-11-04 ENCOUNTER — OFFICE VISIT (OUTPATIENT)
Dept: SURGERY | Age: 54
End: 2024-11-04
Payer: COMMERCIAL

## 2024-11-04 ENCOUNTER — TELEPHONE (OUTPATIENT)
Dept: SURGERY | Age: 54
End: 2024-11-04

## 2024-11-04 VITALS — SYSTOLIC BLOOD PRESSURE: 136 MMHG | DIASTOLIC BLOOD PRESSURE: 86 MMHG | BODY MASS INDEX: 36.34 KG/M2 | WEIGHT: 253.3 LBS

## 2024-11-04 DIAGNOSIS — K40.90 NON-RECURRENT UNILATERAL INGUINAL HERNIA WITHOUT OBSTRUCTION OR GANGRENE: Primary | ICD-10-CM

## 2024-11-04 PROCEDURE — G8427 DOCREV CUR MEDS BY ELIG CLIN: HCPCS | Performed by: SURGERY

## 2024-11-04 PROCEDURE — G8417 CALC BMI ABV UP PARAM F/U: HCPCS | Performed by: SURGERY

## 2024-11-04 PROCEDURE — G8484 FLU IMMUNIZE NO ADMIN: HCPCS | Performed by: SURGERY

## 2024-11-04 PROCEDURE — 99214 OFFICE O/P EST MOD 30 MIN: CPT | Performed by: SURGERY

## 2024-11-04 PROCEDURE — 1036F TOBACCO NON-USER: CPT | Performed by: SURGERY

## 2024-11-04 PROCEDURE — 3017F COLORECTAL CA SCREEN DOC REV: CPT | Performed by: SURGERY

## 2024-11-04 RX ORDER — CIPROFLOXACIN 500 MG/1
500 TABLET, FILM COATED ORAL 2 TIMES DAILY
Qty: 14 TABLET | Refills: 0 | Status: SHIPPED | OUTPATIENT
Start: 2024-11-04 | End: 2024-11-11

## 2024-11-04 NOTE — PROGRESS NOTES
Patient Assistance    Met with: Call w/ Patient     Navigator Type: Infusion  Documentation Type: New Patient  Contact Type: Telephone  Status of Patient Insurance Coverage: Patient has active coverage          Additional notes: [employer] Leqvio Co-Pay Card Enrollment/Approval     Drug Name: OTHER  Other Drug Name: Leqvio  Form of PAP Assistance: Copay / Foundation (Approved)

## 2024-11-04 NOTE — PROGRESS NOTES
Subjective   Patient ID: Milad Peters is a 54 y.o. male.    Chief complaint-testicular pain    [id number]-old male who presents for evaluation of testicular pain    Review of Systems       Objective   Physical Exam  Constitutional:       Appearance: He is well-developed.   HENT:      Head: Normocephalic and atraumatic.      Right Ear: External ear normal.      Left Ear: External ear normal.   Eyes:      Conjunctiva/sclera: Conjunctivae normal.   Pulmonary:      Effort: Pulmonary effort is normal. No respiratory distress.      Breath sounds: Normal breath sounds.   Abdominal:      General: There is no distension.      Palpations: Abdomen is soft.      Comments: Small reducible left inguinal hernia  No definite right inguinal hernia  Mild enlargement of the right testicle with tender epididymis   Musculoskeletal:         General: Normal range of motion.      Cervical back: Normal range of motion and neck supple.   Skin:     General: Skin is warm and dry.   Neurological:      Mental Status: He is alert and oriented to person, place, and time.   Psychiatric:         Behavior: Behavior normal.            Assessment   54-year-old male who was scheduled for laparoscopic cholecystectomy and umbilical hernia repair.  The surgery was canceled because of an abnormally thickened esophagus seen on CAT scan of the chest.  The patient had an EGD yesterday which reportedly showed ulcers with no evidence of malignancy.  He presents now with complaints of severe right groin pain.  On physical examination, the pain is located in the right testicular region.  There is no evidence of a right inguinal hernia but he does have a small reducible left inguinal hernia.      Plan   Cipro 500 p.o. twice daily for possible epididymitis  Recommended urology evaluation for right testicular pain  Will add left inguinal hernia repair to upcoming surgery        Dexter Hazel MD

## 2024-11-04 NOTE — TELEPHONE ENCOUNTER
Pt needs to reschedule surgery. Pt states his testicles are now swollen and has pain where his pubic area is. He thinks a hernia has spread there. He is very uncomfortable, please advise [phone]

## 2024-11-05 ENCOUNTER — PREP FOR PROCEDURE (OUTPATIENT)
Dept: SURGERY | Age: 54
End: 2024-11-05

## 2024-11-05 DIAGNOSIS — M79.89 MASS OF SOFT TISSUE OF THIGH: ICD-10-CM

## 2024-11-05 PROBLEM — K42.9 HERNIA, UMBILICAL: Status: ACTIVE | Noted: 2024-11-05

## 2024-11-05 PROBLEM — K40.20 INGUINAL HERNIA, BILATERAL: Status: ACTIVE | Noted: 2024-11-05

## 2024-11-07 ENCOUNTER — TELEPHONE (OUTPATIENT)
Dept: SURGERY | Age: 54
End: 2024-11-07

## 2024-11-07 ENCOUNTER — CLINICAL DOCUMENTATION (OUTPATIENT)
Dept: ONCOLOGY | Age: 54
End: 2024-11-07

## 2024-11-07 NOTE — TELEPHONE ENCOUNTER
Pt states his insurance will cover a hernia repair and reconstructive stomach surgery/tummy tuck at the same time but won't cover tummy tuck by itself. Pt would like to know if Dr. Hazel can do a combined case with plastic surgeon so his insurance covers it. Please call and advise [phone]

## 2024-11-07 NOTE — PROGRESS NOTES
Patient returned call to infusion center, patient under impression that he is not going to be doing leqvio, states Dr. Scruggs thought it would be too aggressive and that his coronary arteries didn't have plaque build up. Instructed patient to follow up with Dr. Scruggs, no notes to that affect in chart.  Patient to call back if decides to schedule leqvio.

## 2024-12-04 ENCOUNTER — ANESTHESIA EVENT (OUTPATIENT)
Dept: OPERATING ROOM | Age: 54
End: 2024-12-04
Payer: COMMERCIAL

## 2024-12-04 ENCOUNTER — ANESTHESIA (OUTPATIENT)
Dept: OPERATING ROOM | Age: 54
End: 2024-12-04
Payer: COMMERCIAL

## 2024-12-04 ENCOUNTER — APPOINTMENT (OUTPATIENT)
Dept: GENERAL RADIOLOGY | Age: 54
End: 2024-12-04
Attending: SURGERY
Payer: COMMERCIAL

## 2024-12-04 ENCOUNTER — HOSPITAL ENCOUNTER (OUTPATIENT)
Age: 54
Setting detail: OUTPATIENT SURGERY
Discharge: HOME | End: 2024-12-04
Attending: SURGERY | Admitting: SURGERY
Payer: COMMERCIAL

## 2024-12-04 VITALS
WEIGHT: 249.2 LBS | SYSTOLIC BLOOD PRESSURE: 149 MMHG | RESPIRATION RATE: 15 BRPM | HEIGHT: 70 IN | HEART RATE: 90 BPM | OXYGEN SATURATION: 93 % | TEMPERATURE: 97.1 F | DIASTOLIC BLOOD PRESSURE: 95 MMHG | BODY MASS INDEX: 35.68 KG/M2

## 2024-12-04 DIAGNOSIS — K40.90 NON-RECURRENT UNILATERAL INGUINAL HERNIA WITHOUT OBSTRUCTION OR GANGRENE: Primary | ICD-10-CM

## 2024-12-04 PROCEDURE — C1894 INTRO/SHEATH, NON-LASER: HCPCS | Performed by: SURGERY

## 2024-12-04 PROCEDURE — 6360000002 HC RX W HCPCS: Performed by: ANESTHESIOLOGY

## 2024-12-04 PROCEDURE — 3700000000 HC ANESTHESIA ATTENDED CARE: Performed by: SURGERY

## 2024-12-04 PROCEDURE — 2580000003 HC RX 258: Performed by: NURSE ANESTHETIST, CERTIFIED REGISTERED

## 2024-12-04 PROCEDURE — 3700000001 HC ADD 15 MINUTES (ANESTHESIA): Performed by: SURGERY

## 2024-12-04 PROCEDURE — S2900 ROBOTIC SURGICAL SYSTEM: HCPCS | Performed by: SURGERY

## 2024-12-04 PROCEDURE — 7100000001 HC PACU RECOVERY - ADDTL 15 MIN: Performed by: SURGERY

## 2024-12-04 PROCEDURE — C1781 MESH (IMPLANTABLE): HCPCS | Performed by: SURGERY

## 2024-12-04 PROCEDURE — 6360000002 HC RX W HCPCS: Performed by: NURSE ANESTHETIST, CERTIFIED REGISTERED

## 2024-12-04 PROCEDURE — 2580000003 HC RX 258: Performed by: SURGERY

## 2024-12-04 PROCEDURE — A4217 STERILE WATER/SALINE, 500 ML: HCPCS | Performed by: SURGERY

## 2024-12-04 PROCEDURE — 7100000011 HC PHASE II RECOVERY - ADDTL 15 MIN: Performed by: SURGERY

## 2024-12-04 PROCEDURE — 6370000000 HC RX 637 (ALT 250 FOR IP): Performed by: ANESTHESIOLOGY

## 2024-12-04 PROCEDURE — 2500000003 HC RX 250 WO HCPCS

## 2024-12-04 PROCEDURE — C1889 IMPLANT/INSERT DEVICE, NOC: HCPCS | Performed by: SURGERY

## 2024-12-04 PROCEDURE — 88304 TISSUE EXAM BY PATHOLOGIST: CPT

## 2024-12-04 PROCEDURE — 7100000000 HC PACU RECOVERY - FIRST 15 MIN: Performed by: SURGERY

## 2024-12-04 PROCEDURE — 2709999900 HC NON-CHARGEABLE SUPPLY: Performed by: SURGERY

## 2024-12-04 PROCEDURE — 7100000010 HC PHASE II RECOVERY - FIRST 15 MIN: Performed by: SURGERY

## 2024-12-04 PROCEDURE — 2500000003 HC RX 250 WO HCPCS: Performed by: NURSE ANESTHETIST, CERTIFIED REGISTERED

## 2024-12-04 PROCEDURE — 6360000002 HC RX W HCPCS: Performed by: SURGERY

## 2024-12-04 PROCEDURE — 3600000019 HC SURGERY ROBOT ADDTL 15MIN: Performed by: SURGERY

## 2024-12-04 PROCEDURE — 2580000003 HC RX 258: Performed by: ANESTHESIOLOGY

## 2024-12-04 PROCEDURE — 3600000009 HC SURGERY ROBOT BASE: Performed by: SURGERY

## 2024-12-04 DEVICE — CLIP LIG MED LG CARTRIDGE INT NON-ABSORBABLE VESOLOCK: Type: IMPLANTABLE DEVICE | Site: BILE DUCT | Status: FUNCTIONAL

## 2024-12-04 DEVICE — MESH HERN W10XL15CM PET PLA 70% CLLGN 30% GLYC LAP SELF: Type: IMPLANTABLE DEVICE | Site: INGUINAL | Status: FUNCTIONAL

## 2024-12-04 RX ORDER — HYDROCODONE BITARTRATE AND ACETAMINOPHEN 5; 325 MG/1; MG/1
1-2 TABLET ORAL EVERY 4 HOURS PRN
Qty: 18 TABLET | Refills: 0 | Status: SHIPPED | OUTPATIENT
Start: 2024-12-04 | End: 2024-12-07

## 2024-12-04 RX ORDER — LABETALOL HYDROCHLORIDE 5 MG/ML
5 INJECTION, SOLUTION INTRAVENOUS
Status: DISCONTINUED | OUTPATIENT
Start: 2024-12-04 | End: 2024-12-04 | Stop reason: HOSPADM

## 2024-12-04 RX ORDER — INDOCYANINE GREEN AND WATER 25 MG
KIT INJECTION
Status: COMPLETED
Start: 2024-12-04 | End: 2024-12-04

## 2024-12-04 RX ORDER — HYDROMORPHONE HYDROCHLORIDE 2 MG/ML
INJECTION, SOLUTION INTRAMUSCULAR; INTRAVENOUS; SUBCUTANEOUS
Status: DISCONTINUED | OUTPATIENT
Start: 2024-12-04 | End: 2024-12-04 | Stop reason: SDUPTHER

## 2024-12-04 RX ORDER — LIDOCAINE HYDROCHLORIDE 10 MG/ML
1 INJECTION, SOLUTION EPIDURAL; INFILTRATION; INTRACAUDAL; PERINEURAL
Status: DISCONTINUED | OUTPATIENT
Start: 2024-12-04 | End: 2024-12-04 | Stop reason: HOSPADM

## 2024-12-04 RX ORDER — SODIUM CHLORIDE 0.9 % (FLUSH) 0.9 %
5-40 SYRINGE (ML) INJECTION PRN
Status: DISCONTINUED | OUTPATIENT
Start: 2024-12-04 | End: 2024-12-04 | Stop reason: HOSPADM

## 2024-12-04 RX ORDER — ROCURONIUM BROMIDE 10 MG/ML
INJECTION, SOLUTION INTRAVENOUS
Status: DISCONTINUED | OUTPATIENT
Start: 2024-12-04 | End: 2024-12-04 | Stop reason: SDUPTHER

## 2024-12-04 RX ORDER — PROCHLORPERAZINE EDISYLATE 5 MG/ML
5 INJECTION INTRAMUSCULAR; INTRAVENOUS ONCE
Status: COMPLETED | OUTPATIENT
Start: 2024-12-04 | End: 2024-12-04

## 2024-12-04 RX ORDER — NALOXONE HYDROCHLORIDE 0.4 MG/ML
INJECTION, SOLUTION INTRAMUSCULAR; INTRAVENOUS; SUBCUTANEOUS PRN
Status: DISCONTINUED | OUTPATIENT
Start: 2024-12-04 | End: 2024-12-04 | Stop reason: HOSPADM

## 2024-12-04 RX ORDER — PROPOFOL 10 MG/ML
INJECTION, EMULSION INTRAVENOUS
Status: DISCONTINUED | OUTPATIENT
Start: 2024-12-04 | End: 2024-12-04 | Stop reason: SDUPTHER

## 2024-12-04 RX ORDER — LIDOCAINE HYDROCHLORIDE 20 MG/ML
INJECTION, SOLUTION EPIDURAL; INFILTRATION; INTRACAUDAL; PERINEURAL
Status: DISCONTINUED | OUTPATIENT
Start: 2024-12-04 | End: 2024-12-04 | Stop reason: SDUPTHER

## 2024-12-04 RX ORDER — SODIUM CHLORIDE 9 MG/ML
INJECTION, SOLUTION INTRAVENOUS
Status: DISCONTINUED | OUTPATIENT
Start: 2024-12-04 | End: 2024-12-04 | Stop reason: SDUPTHER

## 2024-12-04 RX ORDER — INDOCYANINE GREEN AND WATER 25 MG
5 KIT INJECTION ONCE
Status: COMPLETED | OUTPATIENT
Start: 2024-12-04 | End: 2024-12-04

## 2024-12-04 RX ORDER — HYDROMORPHONE HYDROCHLORIDE 2 MG/ML
0.5 INJECTION, SOLUTION INTRAMUSCULAR; INTRAVENOUS; SUBCUTANEOUS EVERY 5 MIN PRN
Status: DISCONTINUED | OUTPATIENT
Start: 2024-12-04 | End: 2024-12-04 | Stop reason: HOSPADM

## 2024-12-04 RX ORDER — FENTANYL CITRATE 50 UG/ML
INJECTION, SOLUTION INTRAMUSCULAR; INTRAVENOUS
Status: DISCONTINUED | OUTPATIENT
Start: 2024-12-04 | End: 2024-12-04 | Stop reason: SDUPTHER

## 2024-12-04 RX ORDER — OXYCODONE HYDROCHLORIDE 5 MG/1
5 TABLET ORAL
Status: COMPLETED | OUTPATIENT
Start: 2024-12-04 | End: 2024-12-04

## 2024-12-04 RX ORDER — SODIUM CHLORIDE 0.9 % (FLUSH) 0.9 %
5-40 SYRINGE (ML) INJECTION EVERY 12 HOURS SCHEDULED
Status: DISCONTINUED | OUTPATIENT
Start: 2024-12-04 | End: 2024-12-04 | Stop reason: HOSPADM

## 2024-12-04 RX ORDER — MAGNESIUM HYDROXIDE 1200 MG/15ML
LIQUID ORAL CONTINUOUS PRN
Status: COMPLETED | OUTPATIENT
Start: 2024-12-04 | End: 2024-12-04

## 2024-12-04 RX ORDER — SUCCINYLCHOLINE CHLORIDE 20 MG/ML
INJECTION INTRAMUSCULAR; INTRAVENOUS
Status: DISCONTINUED | OUTPATIENT
Start: 2024-12-04 | End: 2024-12-04 | Stop reason: SDUPTHER

## 2024-12-04 RX ORDER — PROMETHAZINE HYDROCHLORIDE 25 MG/ML
6.25 INJECTION, SOLUTION INTRAMUSCULAR; INTRAVENOUS ONCE
Status: DISCONTINUED | OUTPATIENT
Start: 2024-12-04 | End: 2024-12-04 | Stop reason: HOSPADM

## 2024-12-04 RX ORDER — GLYCOPYRROLATE 0.2 MG/ML
INJECTION INTRAMUSCULAR; INTRAVENOUS
Status: DISCONTINUED | OUTPATIENT
Start: 2024-12-04 | End: 2024-12-04 | Stop reason: SDUPTHER

## 2024-12-04 RX ORDER — SODIUM CHLORIDE 9 MG/ML
INJECTION, SOLUTION INTRAVENOUS PRN
Status: DISCONTINUED | OUTPATIENT
Start: 2024-12-04 | End: 2024-12-04 | Stop reason: HOSPADM

## 2024-12-04 RX ORDER — DEXAMETHASONE SODIUM PHOSPHATE 10 MG/ML
INJECTION, SOLUTION INTRAMUSCULAR; INTRAVENOUS
Status: DISCONTINUED | OUTPATIENT
Start: 2024-12-04 | End: 2024-12-04 | Stop reason: SDUPTHER

## 2024-12-04 RX ORDER — HYDROMORPHONE HYDROCHLORIDE 2 MG/ML
0.25 INJECTION, SOLUTION INTRAMUSCULAR; INTRAVENOUS; SUBCUTANEOUS EVERY 5 MIN PRN
Status: DISCONTINUED | OUTPATIENT
Start: 2024-12-04 | End: 2024-12-04 | Stop reason: HOSPADM

## 2024-12-04 RX ORDER — ONDANSETRON 2 MG/ML
INJECTION INTRAMUSCULAR; INTRAVENOUS
Status: DISCONTINUED | OUTPATIENT
Start: 2024-12-04 | End: 2024-12-04 | Stop reason: SDUPTHER

## 2024-12-04 RX ORDER — BUPIVACAINE HYDROCHLORIDE 5 MG/ML
INJECTION, SOLUTION EPIDURAL; INTRACAUDAL
Status: COMPLETED | OUTPATIENT
Start: 2024-12-04 | End: 2024-12-04

## 2024-12-04 RX ORDER — ONDANSETRON 2 MG/ML
4 INJECTION INTRAMUSCULAR; INTRAVENOUS
Status: COMPLETED | OUTPATIENT
Start: 2024-12-04 | End: 2024-12-04

## 2024-12-04 RX ORDER — SCOLOPAMINE TRANSDERMAL SYSTEM 1 MG/1
1 PATCH, EXTENDED RELEASE TRANSDERMAL ONCE
Status: DISCONTINUED | OUTPATIENT
Start: 2024-12-04 | End: 2024-12-04 | Stop reason: HOSPADM

## 2024-12-04 RX ORDER — SODIUM CHLORIDE 9 MG/ML
INJECTION, SOLUTION INTRAVENOUS CONTINUOUS
Status: DISCONTINUED | OUTPATIENT
Start: 2024-12-04 | End: 2024-12-04 | Stop reason: HOSPADM

## 2024-12-04 RX ADMIN — ROCURONIUM BROMIDE 20 MG: 10 INJECTION, SOLUTION INTRAVENOUS at 09:09

## 2024-12-04 RX ADMIN — ROCURONIUM BROMIDE 40 MG: 10 INJECTION, SOLUTION INTRAVENOUS at 07:55

## 2024-12-04 RX ADMIN — HYDROMORPHONE HYDROCHLORIDE 0.2 MG: 2 INJECTION, SOLUTION INTRAMUSCULAR; INTRAVENOUS; SUBCUTANEOUS at 10:02

## 2024-12-04 RX ADMIN — PROCHLORPERAZINE EDISYLATE 5 MG: 5 INJECTION INTRAMUSCULAR; INTRAVENOUS at 11:02

## 2024-12-04 RX ADMIN — PHENYLEPHRINE HYDROCHLORIDE 100 MCG: 10 INJECTION INTRAVENOUS at 09:23

## 2024-12-04 RX ADMIN — PHENYLEPHRINE HYDROCHLORIDE 100 MCG: 10 INJECTION INTRAVENOUS at 08:31

## 2024-12-04 RX ADMIN — SODIUM CHLORIDE: 9 INJECTION, SOLUTION INTRAVENOUS at 07:42

## 2024-12-04 RX ADMIN — GLYCOPYRROLATE 0.2 MG: 0.2 INJECTION, SOLUTION INTRAMUSCULAR; INTRAVENOUS at 08:22

## 2024-12-04 RX ADMIN — PROPOFOL 200 MG: 10 INJECTION, EMULSION INTRAVENOUS at 07:50

## 2024-12-04 RX ADMIN — ONDANSETRON 4 MG: 2 INJECTION, SOLUTION INTRAMUSCULAR; INTRAVENOUS at 10:46

## 2024-12-04 RX ADMIN — HYDROMORPHONE HYDROCHLORIDE 0.4 MG: 2 INJECTION, SOLUTION INTRAMUSCULAR; INTRAVENOUS; SUBCUTANEOUS at 09:56

## 2024-12-04 RX ADMIN — HYDROMORPHONE HYDROCHLORIDE 0.4 MG: 2 INJECTION, SOLUTION INTRAMUSCULAR; INTRAVENOUS; SUBCUTANEOUS at 10:08

## 2024-12-04 RX ADMIN — DEXAMETHASONE SODIUM PHOSPHATE 8 MG: 10 INJECTION, SOLUTION INTRAMUSCULAR; INTRAVENOUS at 07:55

## 2024-12-04 RX ADMIN — FENTANYL CITRATE 50 MCG: 50 INJECTION, SOLUTION INTRAMUSCULAR; INTRAVENOUS at 08:09

## 2024-12-04 RX ADMIN — Medication 30 MG: at 08:08

## 2024-12-04 RX ADMIN — PHENYLEPHRINE HYDROCHLORIDE 200 MCG: 10 INJECTION INTRAVENOUS at 08:57

## 2024-12-04 RX ADMIN — ROCURONIUM BROMIDE 10 MG: 10 INJECTION, SOLUTION INTRAVENOUS at 08:18

## 2024-12-04 RX ADMIN — SUCCINYLCHOLINE CHLORIDE 120 MG: 20 INJECTION, SOLUTION INTRAMUSCULAR; INTRAVENOUS at 07:51

## 2024-12-04 RX ADMIN — INDOCYANINE GREEN AND WATER 5 MG: KIT at 07:38

## 2024-12-04 RX ADMIN — PHENYLEPHRINE HYDROCHLORIDE 100 MCG: 10 INJECTION INTRAVENOUS at 09:59

## 2024-12-04 RX ADMIN — FENTANYL CITRATE 50 MCG: 50 INJECTION, SOLUTION INTRAMUSCULAR; INTRAVENOUS at 07:51

## 2024-12-04 RX ADMIN — HYDROMORPHONE HYDROCHLORIDE 0.4 MG: 2 INJECTION, SOLUTION INTRAMUSCULAR; INTRAVENOUS; SUBCUTANEOUS at 10:15

## 2024-12-04 RX ADMIN — CEFAZOLIN 2000 MG: 2 INJECTION, POWDER, FOR SOLUTION INTRAMUSCULAR; INTRAVENOUS at 07:55

## 2024-12-04 RX ADMIN — ROCURONIUM BROMIDE 10 MG: 10 INJECTION, SOLUTION INTRAVENOUS at 08:52

## 2024-12-04 RX ADMIN — Medication 10 MG: at 09:02

## 2024-12-04 RX ADMIN — GLYCOPYRROLATE 0.2 MG: 0.2 INJECTION, SOLUTION INTRAMUSCULAR; INTRAVENOUS at 09:59

## 2024-12-04 RX ADMIN — Medication 10 MG: at 09:07

## 2024-12-04 RX ADMIN — ROCURONIUM BROMIDE 10 MG: 10 INJECTION, SOLUTION INTRAVENOUS at 08:28

## 2024-12-04 RX ADMIN — LIDOCAINE HYDROCHLORIDE 100 MG: 20 INJECTION, SOLUTION EPIDURAL; INFILTRATION; INTRACAUDAL; PERINEURAL at 07:50

## 2024-12-04 RX ADMIN — ONDANSETRON 4 MG: 2 INJECTION INTRAMUSCULAR; INTRAVENOUS at 09:44

## 2024-12-04 RX ADMIN — PHENYLEPHRINE HYDROCHLORIDE 100 MCG: 10 INJECTION INTRAVENOUS at 08:25

## 2024-12-04 RX ADMIN — SUGAMMADEX 200 MG: 100 INJECTION, SOLUTION INTRAVENOUS at 09:45

## 2024-12-04 RX ADMIN — OXYCODONE 5 MG: 5 TABLET ORAL at 12:50

## 2024-12-04 RX ADMIN — HYDROMORPHONE HYDROCHLORIDE 0.2 MG: 2 INJECTION, SOLUTION INTRAMUSCULAR; INTRAVENOUS; SUBCUTANEOUS at 09:51

## 2024-12-04 RX ADMIN — HYDROMORPHONE HYDROCHLORIDE 0.2 MG: 2 INJECTION, SOLUTION INTRAMUSCULAR; INTRAVENOUS; SUBCUTANEOUS at 09:55

## 2024-12-04 RX ADMIN — ROCURONIUM BROMIDE 10 MG: 10 INJECTION, SOLUTION INTRAVENOUS at 07:50

## 2024-12-04 RX ADMIN — HYDROMORPHONE HYDROCHLORIDE 0.2 MG: 2 INJECTION, SOLUTION INTRAMUSCULAR; INTRAVENOUS; SUBCUTANEOUS at 09:53

## 2024-12-04 RX ADMIN — PHENYLEPHRINE HYDROCHLORIDE 100 MCG: 10 INJECTION INTRAVENOUS at 09:00

## 2024-12-04 RX ADMIN — SODIUM CHLORIDE: 9 INJECTION, SOLUTION INTRAVENOUS at 06:59

## 2024-12-04 ASSESSMENT — PAIN SCALES - GENERAL: PAINLEVEL_OUTOF10: 5

## 2024-12-04 ASSESSMENT — PAIN DESCRIPTION - DESCRIPTORS: DESCRIPTORS: SHARP

## 2024-12-04 ASSESSMENT — PAIN - FUNCTIONAL ASSESSMENT: PAIN_FUNCTIONAL_ASSESSMENT: 0-10

## 2024-12-04 ASSESSMENT — PAIN DESCRIPTION - LOCATION: LOCATION: ABDOMEN

## 2024-12-04 NOTE — H&P
Milad Peters     CC-LIH and CCC    HPI: 54 year old male here for LIH and lap joaquim    Past Medical History:   Diagnosis Date    Hyperlipidemia     Inflammatory disorder of cartilage     Prediabetes     Prostatitis        Past Surgical History:   Procedure Laterality Date    BACK SURGERY      \"COMPLETE CAGE IN LOWER BACK\"    HERNIA REPAIR      BABY BILAT ING HERNIA    OTHER SURGICAL HISTORY Right 2018    EXCISION OF LIPOMA RIGHT UPPER ABDOMEN       Social History     Socioeconomic History    Marital status:      Spouse name: Not on file    Number of children: Not on file    Years of education: Not on file    Highest education level: Not on file   Occupational History    Not on file   Tobacco Use    Smoking status: Former     Current packs/day: 0.00     Average packs/day: 0.5 packs/day for 12.0 years (6.0 ttl pk-yrs)     Types: Cigarettes     Start date:      Quit date:      Years since quittin.9    Smokeless tobacco: Never   Vaping Use    Vaping status: Never Used   Substance and Sexual Activity    Alcohol use: Yes     Alcohol/week: 3.0 standard drinks of alcohol     Types: 1 Glasses of wine, 1 Cans of beer, 1 Shots of liquor per week     Comment: occ    Drug use: No    Sexual activity: Not on file   Other Topics Concern    Not on file   Social History Narrative    Not on file     Social Determinants of Health     Financial Resource Strain: Not on file   Food Insecurity: No Transportation Needs (2024)    Received from  [facility]    Yearly Questionnaire     Do you need any assistance with obtaining housing, meals, medication, transportation or medical equipment?: No     Assistance needed for:: Not on file   Transportation Needs: No Transportation Needs (2024)    Received from  [facility],  [facility],  [facility]    Yearly Questionnaire     Do you need any assistance with obtaining housing, meals, medication, transportation or medical equipment?: No     Assistance

## 2024-12-04 NOTE — PROGRESS NOTES
Pt received from PACU, pt awake alert and oriented, vss, resp even and easy, pt lying on side, abd incisions closed with surgical glue, bedside report received from Joaquina MUJICA.

## 2024-12-04 NOTE — DISCHARGE INSTRUCTIONS
Office number - [phone]   Office hours are 8 am - 5 pm  Monday - Friday   Contact the doctor on call during the evenings ( 5 pm - 8 am ) or on Weekends for urgent or emergent issues by using the main office number ( [phone] ).  Please hold routine questions until normal business hours.        Wound care     The incisions are closed with dissolvable sutures which are beneath the skin. Surgical glue is then applied to the skin. The glue is purple in color and should be left undisturbed until your follow-up appointment.  No bandages are required over the surgical glue.  It is okay to shower but do not bathe in a bathtub.  Gently wash over the incisions with soap and water and then pat them dry with a towel. Contact the office for redness of the skin surrounding the incision or if there is drainage of pus.        Activities     It is generally okay to go up and down stairs. Please be careful as your gate may be unsteady from the surgery or pain medications.     Driving: Do not drive while on narcotic pain medications or while still under the effect of narcotic pain medications. Make sure that you are moving comfortably and not limited by postoperative pain or weakness that would make it difficult to react in an emergency situation.     Exercise : The main purpose of the activity restrictions is to reduce the risk of developing a hernia at an incision site.                    Do not lift greater than 25 lbs                    Avoid strenuous abdominal exercises- sit ups, core workouts                    Light cardiovascular exercise is generally okay                    Ask your doctor about the length of the exercise restrictions     Follow up     Please call the office for any concerns between the time of surgery and your follow up appointment.  For your convenience, we ask that you schedule your follow up appointment about 2 weeks from the time of surgery at a time which works best with your schedule. Please

## 2024-12-04 NOTE — PROGRESS NOTES
Patient transferred from OR to PACU, responds to voice, VSS, denies pain, 5 abdominal incisions and 1 right inner thigh incision well approximated with surgical glue.

## 2024-12-04 NOTE — PROGRESS NOTES
Pt up to side of bed, pt has complaints of nausea. Spoke with Dr. Mccoy anesthesia order received for phenergan IM and scop patch.

## 2024-12-04 NOTE — PROGRESS NOTES
Patient awake, VSS, zofran and compazine given for nausea, states it's \"better,\" denies pain, phase I discharge criteria met, will transfer to [facility].

## 2024-12-04 NOTE — ANESTHESIA PRE PROCEDURE
cartilage    • Prediabetes    • Prostatitis        Past Surgical History:        Procedure Laterality Date   • BACK SURGERY      \"COMPLETE CAGE IN LOWER BACK\"   • HERNIA REPAIR      BABY BILAT ING HERNIA   • OTHER SURGICAL HISTORY Right 2018    EXCISION OF LIPOMA RIGHT UPPER ABDOMEN       Social History:    Social History     Tobacco Use   • Smoking status: Former     Current packs/day: 0.00     Average packs/day: 0.5 packs/day for 12.0 years (6.0 ttl pk-yrs)     Types: Cigarettes     Start date:      Quit date:      Years since quittin.9   • Smokeless tobacco: Never   Substance Use Topics   • Alcohol use: Yes     Alcohol/week: 3.0 standard drinks of alcohol     Types: 1 Glasses of wine, 1 Cans of beer, 1 Shots of liquor per week     Comment: occ                                Counseling given: Not Answered      Vital Signs (Current):   Vitals:    24 1454 24 0633   BP:  (!) 133/91   Pulse:  64   Resp:  16   Temp:  98.2 °F (36.8 °C)   TempSrc:  Temporal   SpO2:  99%   Weight: 102.1 kg (225 lb) 113 kg (249 lb 3.2 oz)   Height: 1.778 m (5' 10\")                                               BP Readings from Last 3 Encounters:   24 (!) 133/91   24 136/86   10/21/24 (!) 137/98       NPO Status: Time of last liquid consumption: 1800                        Time of last solid consumption: 1800                        Date of last liquid consumption: 24                        Date of last solid food consumption: 24    BMI:   Wt Readings from Last 3 Encounters:   24 113 kg (249 lb 3.2 oz)   24 114.9 kg (253 lb 4.8 oz)   10/21/24 106.6 kg (235 lb)     Body mass index is 35.76 kg/m².    CBC:   Lab Results   Component Value Date/Time    WBC 7.5 2018 06:30 AM    RBC 4.78 2018 06:30 AM    HGB 14.9 2018 06:30 AM    HCT 42.6 2018 06:30 AM    MCV 89.1 2018 06:30 AM    RDW 13.2 2018 06:30 AM     2018 06:30 AM       CMP:

## 2024-12-04 NOTE — ANESTHESIA POSTPROCEDURE EVALUATION
Department of Anesthesiology  Postprocedure Note    Patient: Milad Peters  MRN: [id number]  YOB: 1970  Date of evaluation: 12/4/2024    Procedure Summary       Date: 12/04/24 Room / Location: [address]    Anesthesia Start: 0742 Anesthesia Stop: 1028    Procedures:       ROBOT ASSISTED LAPAROSCOPIC CHOLECYSTECTOMY (Abdomen)      ROBOT ASSISTED LAPAROSCOPIC LEFT INGUINAL HERNIA REPAIR WITH MESH (Left: Groin)      OPEN UMBILICAL HERNIA REPAIR (Abdomen)      EXCISION SOFT TISSUE MASS RIGHT THIGH (Right: Leg Upper) Diagnosis:       CCC (chronic calculous cholecystitis)      Inguinal hernia, bilateral      Hernia, umbilical      Mass of soft tissue of thigh      (CCC (chronic calculous cholecystitis) [K80.10])      (Inguinal hernia, bilateral [K40.20])      (Hernia, umbilical [K42.9])      (Mass of soft tissue of thigh [M79.89])    Surgeons: Dexter Hazel MD Responsible Provider: Mirian Mccoy MD    Anesthesia Type: general ASA Status: 3            Anesthesia Type: No value filed.    Aditya Phase I: Aditya Score: 10    Aditya Phase II: Aditya Score: 10    Anesthesia Post Evaluation    Patient location during evaluation: PACU  Patient participation: complete - patient participated  Level of consciousness: awake  Airway patency: patent  Cardiovascular status: hemodynamically stable  Respiratory status: acceptable  Hydration status: euvolemic  Multimodal analgesia pain management approach    No notable events documented.

## 2024-12-04 NOTE — BRIEF OP NOTE
Brief Postoperative Note      Patient: Milad Peters  YOB: 1970  MRN: [id number]    Date of Procedure: 12/4/2024    Pre-Op Diagnosis Codes:      * CCC (chronic calculous cholecystitis) [K80.10]     * Inguinal hernia, bilateral [K40.20]     * Hernia, umbilical [K42.9]     * Mass of soft tissue of thigh [M79.89]    Post-Op Diagnosis: Same       Procedure(s):  ROBOT ASSISTED LAPAROSCOPIC CHOLECYSTECTOMY  ROBOT ASSISTED LAPAROSCOPIC LEFT INGUINAL HERNIA REPAIR WITH MESH  OPEN UMBILICAL HERNIA REPAIR  EXCISION SOFT TISSUE MASS RIGHT THIGH    Surgeon(s):  Dexter Hazel MD Fitzpatrick, James E, MD    Assistant:  Surgical Assistant: Mario Cameron RN    Anesthesia: General    Estimated Blood Loss (mL): Minimal    Complications: None    Specimens:   ID Type Source Tests Collected by Time Destination   A : A) Gallbladder Tissue Gallbladder SURGICAL PATHOLOGY Dexter Hazel MD 12/4/2024 0923        Implants:  Implant Name Type Inv. Item Serial No.  Lot No. LRB No. Used Action   MESH KEESHA [id number] PET REMIGIO 70% CLLGN 30% GLYC LAP SELF - [id number]  MESH KEESHA [id number] PET REMIGIO 70% CLLGN 30% GLYC LAP SELF  [employer]EN US SURGICAL-WD [id number] Left 1 Implanted   CLIP LIG MED LG CARTRIDGE INT NON-ABSORBABLE VESOLOCK - [id number]  CLIP LIG MED LG CARTRIDGE INT NON-ABSORBABLE VESOLOCK  [employer] MEDICAL-WD [id number] Left 1 Implanted         Drains: * No LDAs found *    Findings:  Infection Present At Time Of Surgery (PATOS) (choose all levels that have infection present):  No infection present  Other Findings: L inguinal hernia repaired, lap joaquim performed, 3.5 cm lipoma R groin excised    Electronically signed by Dexter Hazel MD on 12/4/2024 at 10:06 AM

## 2024-12-05 NOTE — OP NOTE
8 mm trocars.  The bed was placed in 15 degrees of Trendelenburg.  There was no evidence of a right inguinal hernia.  He had a moderate-sized direct left inguinal hernia.  He had no scarring to the midline from his previous spine exposure.  We created a peritoneal flap starting just to the left of midline and opened it to just above the left ASIS.  The flap was then developed inferiorly.  The lateral space was cleared with blunt dissection and cautery.  There was some scarring in this area, which made the flap more difficult to dissect.  We then reduced the direct inguinal hernia down to below the anticipated inferior margin of the mesh.  The pubic tubercle as well as Isaac ligament was cleared.  The peritoneum on the cord was also dissected down to below the anticipated inferior margin of the mesh.    We selected a 10 x 15 cm ProGrip mesh.  The mesh was positioned so that about 5 mm was beneath the pubic tubercle and Isaac ligament.  The mesh did lay flat and covered all hernia spaces.  The peritoneal flap was closed with a running 2-0 V-Loc suture.    We then re-docked the trocar and targeted the right upper quadrant.  The gallbladder was grasped, and then dissection was undertaken in the hilar region.  The cystic duct and cystic artery were both identified and dissected free circumferentially.  The cystic duct was doubly clipped distally, singly clipped proximally, and divided.  The cystic artery was singly clipped proximally, singly clipped distally, and divided.  The gallbladder was taken off the liver bed with Bovie electrocautery.  Prior to detaching the gallbladder, we inspected the liver bed, and there was no evidence of bleeding or bile leak.  The gallbladder was then detached, placed in an EndoCatch bag, and then removed through the left-sided periumbilical trocar site.    All the trocars were removed under direct visualization, and the abdomen was de-insufflated.    We made a 3 cm transverse incision

## 2024-12-12 ENCOUNTER — TELEPHONE (OUTPATIENT)
Dept: SURGERY | Age: 54
End: 2024-12-12

## 2024-12-12 NOTE — TELEPHONE ENCOUNTER
Spoke with patient and advised it is not unusual for this to happen.He will call the office with any questions or concerns.

## 2024-12-12 NOTE — TELEPHONE ENCOUNTER
Pt got out of the shower today and noticed his testicles are black. Is this normal? Should he be concerned? He wonders if he is leaking blood. Please advise [phone]

## 2024-12-16 NOTE — PROGRESS NOTES
intolerance. No excessive thirst, fluid intake, or urination. No tremor.  Hematologic/Lymphatic: No abnormal bruising or bleeding, blood clots or swollen lymph nodes.  Allergic/Immunologic: No nasal congestion or hives.    Physical Examination:    Vitals:    12/23/24 0758   BP: 128/88   Pulse: 78   SpO2: 98%            Wt Readings from Last 1 Encounters:   12/23/24 111.1 kg (245 lb)        Constitutional and General Appearance: NAD   Skin:good turgor,intact without lesions  HEENT: EOMI ,normal  Neck:no JVD     Respiratory:  Normal excursion and expansion without use of accessory muscles  Resp Auscultation: Normal breath sounds without dullness  Cardiovascular:  The apical impulses not displaced  Heart tones are crisp and normal  Cervical veins are not engorged  The carotid upstroke is normal in amplitude and contour without delay or bruit  Peripheral pulses are symmetrical and full  There is no clubbing, cyanosis of the extremities.  No edema  Femoral Arteries: 2+ and equal  Pedal Pulses: 2+ and equal   Abdomen:  No masses or tenderness  Liver/Spleen: No Abnormalities Noted  Neurological/Psychiatric:  Alert and oriented in all spheres  Moves all extremities well  Exhibits normal gait balance and coordination  No abnormalities of mood, affect, memory, mentation, or behavior are noted    Stress echo 7/2022   Summary   -Normal stress echocardiogram study.   -Excellent exercise tolerance    Cardiac CTA: 10/21/24  Impression:      CARDIAC     1. Total calcium score 0.     2. No evidence of coronary stenosis or plaque by Coronary CT Angiography.    NONCARDIAC     1. Findings suggesting esophagitis in the distal thoracic esophagus,  including circumferential wall thickening.  Adjacent nonenlarged  paraesophageal lymph nodes are likely reactive, although early metastatic  involvement from esophageal malignancy could result in similar findings.  Consider further evaluation fluoroscopy or endoscopy.  2. Pulmonary artery

## 2024-12-23 ENCOUNTER — OFFICE VISIT (OUTPATIENT)
Dept: CARDIOLOGY CLINIC | Age: 54
End: 2024-12-23
Payer: COMMERCIAL

## 2024-12-23 VITALS
HEIGHT: 70 IN | OXYGEN SATURATION: 98 % | BODY MASS INDEX: 35.07 KG/M2 | SYSTOLIC BLOOD PRESSURE: 128 MMHG | HEART RATE: 78 BPM | WEIGHT: 245 LBS | DIASTOLIC BLOOD PRESSURE: 88 MMHG

## 2024-12-23 DIAGNOSIS — E78.5 HYPERLIPIDEMIA, UNSPECIFIED HYPERLIPIDEMIA TYPE: Primary | ICD-10-CM

## 2024-12-23 PROCEDURE — 99214 OFFICE O/P EST MOD 30 MIN: CPT | Performed by: INTERNAL MEDICINE

## 2024-12-23 PROCEDURE — 1036F TOBACCO NON-USER: CPT | Performed by: INTERNAL MEDICINE

## 2024-12-23 PROCEDURE — 3017F COLORECTAL CA SCREEN DOC REV: CPT | Performed by: INTERNAL MEDICINE

## 2024-12-23 PROCEDURE — G8417 CALC BMI ABV UP PARAM F/U: HCPCS | Performed by: INTERNAL MEDICINE

## 2024-12-23 PROCEDURE — G8484 FLU IMMUNIZE NO ADMIN: HCPCS | Performed by: INTERNAL MEDICINE

## 2024-12-23 PROCEDURE — G8427 DOCREV CUR MEDS BY ELIG CLIN: HCPCS | Performed by: INTERNAL MEDICINE

## 2024-12-27 ENCOUNTER — OFFICE VISIT (OUTPATIENT)
Dept: SURGERY | Age: 54
End: 2024-12-27

## 2024-12-27 VITALS — SYSTOLIC BLOOD PRESSURE: 138 MMHG | DIASTOLIC BLOOD PRESSURE: 88 MMHG | BODY MASS INDEX: 34.92 KG/M2 | WEIGHT: 243.4 LBS

## 2024-12-27 DIAGNOSIS — K80.10 CCC (CHRONIC CALCULOUS CHOLECYSTITIS): Primary | ICD-10-CM

## 2024-12-27 PROCEDURE — 99024 POSTOP FOLLOW-UP VISIT: CPT | Performed by: SURGERY

## 2024-12-27 NOTE — PROGRESS NOTES
Subjective   Patient ID: Milad Peters is a 54 y.o. male.    HPI    Review of Systems       Objective   Physical Exam   Abdomen soft  Incisions healing well    Assessment   54-year-old male status post robotic laparoscopic left inguinal hernia repair with mesh, robotic laparoscopic cholecystectomy, umbilical hernia repair and excision of lipoma of the right thigh.  Pathology on the gallbladder showed chronic cholecystitis.  The right thigh mass was consistent with a lipoma.  He is doing well postoperatively.      Plan   Continue lifting restrictions for 1 more week.  Follow-up as needed.        Dexter Hazel MD

## 2025-02-05 ENCOUNTER — TELEPHONE (OUTPATIENT)
Dept: CARDIOLOGY CLINIC | Age: 55
End: 2025-02-05

## 2025-02-05 NOTE — TELEPHONE ENCOUNTER
Called and spoke with pt, pt states his ins. changed(reflects in media tab). Pt wants to see if another auth can be ran. I did advise pt of LES message and in response pt stated he didn't really want to take anything else. Pt also stated his neurologist told him to take asa.

## 2025-02-05 NOTE — TELEPHONE ENCOUNTER
[facility] infusion auth dept states pt's Leqvio was denied. Pt needs to try repatha or praluent for 120 days and then retest. [facility] is faxing denial letter. Any questions please call.

## 2025-02-06 NOTE — TELEPHONE ENCOUNTER
We need a prescription placed to send in PA. Also will need DX code if this is not listed. This team cannot process a current PA without a prescription on the current med list.     If this requires a response please respond to the pool ( P [facility] MEDICATION PRE-AUTH).       Thank you please advise patient.

## 2025-04-07 NOTE — PROGRESS NOTES
1st Attempt- LMOM asking pt to call back and schedule STAT Echo ordered by , patient due for nonfasting labs around 4/21/25, Pt due to see  4/28/25 (HELD TIME ON 4/28 AT 1130)    Discussed phenergan with pt and wife. Pt would like to go home and not receive phenergan at this time.

## (undated) DEVICE — BLANKET WRM W29.9XL79.1IN UP BODY FORC AIR MISTRAL-AIR

## (undated) DEVICE — PENCIL SMK EVAC TELSCP 3 M TBNG

## (undated) DEVICE — CATHETER CHOLGM 4.5FR L18IN W/ MTL SUPP TB

## (undated) DEVICE — GENERAL ROBOT: Brand: MEDLINE INDUSTRIES, INC.

## (undated) DEVICE — SUTURE VICRYL + SZ 3-0 L36IN ABSRB UD L36MM CT-1 1/2 CIR VCP944H

## (undated) DEVICE — 3M™ TEGADERM™ TRANSPARENT FILM DRESSING FRAME STYLE, 1628, 6 IN X 8 IN (15 CM X 20 CM), 10/CT 8CT/CASE: Brand: 3M™ TEGADERM™

## (undated) DEVICE — INSUFFLATION NEEDLE TO ESTABLISH PNEUMOPERITONEUM.: Brand: INSUFFLATION NEEDLE

## (undated) DEVICE — SUTURE VICRYL + SZ 3-0 L18IN ABSRB UD SH 1/2 CIR TAPERCUT NDL VCP864D

## (undated) DEVICE — STERILE POLYISOPRENE POWDER-FREE SURGICAL GLOVES: Brand: PROTEXIS

## (undated) DEVICE — SYRINGE, LUER LOCK, 10ML: Brand: MEDLINE

## (undated) DEVICE — BAG RETRIEVAL SPECIMEN SUPERBAG 5 SMALL NYLON ITRODUCER

## (undated) DEVICE — INTENDED FOR TISSUE SEPARATION, AND OTHER PROCEDURES THAT REQUIRE A SHARP SURGICAL BLADE TO PUNCTURE OR CUT.: Brand: BARD-PARKER ® STAINLESS STEEL BLADES

## (undated) DEVICE — TIP COVER ACCESSORY

## (undated) DEVICE — C-ARM: Brand: UNBRANDED

## (undated) DEVICE — SUTURE ETHIBOND EXCEL SZ 0 L18IN NONABSORBABLE GRN L26MM MO-6 CX45D

## (undated) DEVICE — COVER,TABLE,77X90,STERILE: Brand: MEDLINE

## (undated) DEVICE — BLADE ES ELASTOMERIC COAT INSUL DURABLE BEND UPTO 90DEG

## (undated) DEVICE — COVER LT HNDL BLU PLAS

## (undated) DEVICE — SET EXTN PRIMING 4.9ML L30IN INCL SLDE CLMP SPIN M LUERLOCK

## (undated) DEVICE — SUTURE PROL SZ 0 L18IN NONABSORBABLE BLU MO-6 L26MM 1/2 CIR C845G

## (undated) DEVICE — SOLUTION IRRIG 1000ML 0.9% SOD CHL USP POUR PLAS BTL

## (undated) DEVICE — NEEDLE,22GX1.5",REG,BEVEL: Brand: MEDLINE

## (undated) DEVICE — APPLICATOR MEDICATED 26 CC SOLUTION HI LT ORNG CHLORAPREP

## (undated) DEVICE — GAUZE,SPONGE,4"X4",8PLY,STRL,LF,10/TRAY: Brand: MEDLINE

## (undated) DEVICE — CONTROL SYRINGE LUER-LOCK TIP: Brand: MONOJECT

## (undated) DEVICE — BLADE CLIPPER GEN PURP NS

## (undated) DEVICE — SUTURE MONOCRYL ABSORBABLE L 6 IN SZ 2-0 POLYGLCOLIC ACD MONOFILAMENT CT-2

## (undated) DEVICE — LIQUIBAND RAPID ADHESIVE 36/CS 0.8ML: Brand: MEDLINE

## (undated) DEVICE — SUTURE VICRYL + SZ 2-0 L36IN ABSRB UD L36MM CT-1 1/2 CIR VCP945H

## (undated) DEVICE — DRAPE ADOLESCENT  LAPAROTOMY

## (undated) DEVICE — 30978 SEE SHARP - ENHANCED INTRAOPERATIVE LAPAROSCOPE CLEANING & DEFOGGING: Brand: 30978 SEE SHARP - ENHANCED INTRAOPERATIVE LAPAROSCOPE CLEANING & DEFOGGING

## (undated) DEVICE — SYRINGE MED 30ML STD CLR PLAS LUERLOCK TIP N CTRL DISP

## (undated) DEVICE — PAD PT POS 36 IN SURGYPAD DISP

## (undated) DEVICE — BLADELESS OBTURATOR: Brand: WECK VISTA

## (undated) DEVICE — Device

## (undated) DEVICE — Device: Brand: MEDEX

## (undated) DEVICE — SEAL

## (undated) DEVICE — SUTURE MONOCRYL + SZ 4-0 L27IN ABSRB UD L19MM PS-2 3/8 CIR MCP426H

## (undated) DEVICE — ARM DRAPE

## (undated) DEVICE — ADHESIVE SKIN CLOSURE WND 8.661X1.5 IN 22 CM LIQUIBAND SECUR

## (undated) DEVICE — CATHETER CHOLANGIOGRAM 4.5 FRX3 IN W/ 20018M55 TAUT INTRO

## (undated) DEVICE — SUTURE VICRYL + SZ 3-0 L27IN ABSRB UD L26MM SH 1/2 CIR VCP416H

## (undated) DEVICE — SHEET, T, LAPAROTOMY, STERILE: Brand: MEDLINE